# Patient Record
Sex: FEMALE | Race: BLACK OR AFRICAN AMERICAN | Employment: OTHER | URBAN - METROPOLITAN AREA
[De-identification: names, ages, dates, MRNs, and addresses within clinical notes are randomized per-mention and may not be internally consistent; named-entity substitution may affect disease eponyms.]

---

## 2017-01-05 DIAGNOSIS — K21.9 GASTROESOPHAGEAL REFLUX DISEASE WITHOUT ESOPHAGITIS: ICD-10-CM

## 2017-01-07 RX ORDER — ESOMEPRAZOLE MAGNESIUM 40 MG/1
40 CAPSULE, DELAYED RELEASE ORAL DAILY
Qty: 30 CAP | Refills: 11 | Status: SHIPPED | OUTPATIENT
Start: 2017-01-07 | End: 2017-02-04 | Stop reason: SDUPTHER

## 2017-02-04 DIAGNOSIS — K21.9 GASTROESOPHAGEAL REFLUX DISEASE WITHOUT ESOPHAGITIS: ICD-10-CM

## 2017-02-04 RX ORDER — ESOMEPRAZOLE MAGNESIUM 40 MG/1
CAPSULE, DELAYED RELEASE ORAL
Qty: 30 CAP | Refills: 11 | Status: SHIPPED | OUTPATIENT
Start: 2017-02-04 | End: 2017-03-15 | Stop reason: DRUGHIGH

## 2017-02-28 ENCOUNTER — TELEPHONE (OUTPATIENT)
Dept: INTERNAL MEDICINE CLINIC | Age: 67
End: 2017-02-28

## 2017-03-02 NOTE — TELEPHONE ENCOUNTER
Patient called and stated that she would like Nexium prescription because it is the only thing that works. She would like the status of the prior authorization. The contact number is [phone].

## 2017-03-03 DIAGNOSIS — E11.42 DIABETIC PERIPHERAL NEUROPATHY ASSOCIATED WITH TYPE 2 DIABETES MELLITUS (HCC): ICD-10-CM

## 2017-03-03 DIAGNOSIS — G60.8 IDIOPATHIC SMALL AND LARGE FIBER SENSORY NEUROPATHY: ICD-10-CM

## 2017-03-03 DIAGNOSIS — M79.2 NEUROPATHIC PAIN: ICD-10-CM

## 2017-03-03 NOTE — TELEPHONE ENCOUNTER
She is not due for refill as prescribed until April 21, please call and find out how much she is taking

## 2017-03-03 NOTE — TELEPHONE ENCOUNTER
Patient says she doesn't see the doctor she was referred to another doctor but doesn't have an appointment until 3/17. Can she get enough gabapentin to last until then? Her other doc didn't feel comfortable prescribing it.

## 2017-03-06 NOTE — TELEPHONE ENCOUNTER
Patient called to check on the status of her prior authorization for the Nexium medication. The contact number is [phone].

## 2017-03-10 ENCOUNTER — DOCUMENTATION ONLY (OUTPATIENT)
Dept: INTERNAL MEDICINE CLINIC | Age: 67
End: 2017-03-10

## 2017-03-10 NOTE — PROGRESS NOTES
This writer spoke with [facility] about the patients medication (nexium). The  Pharmacy states that the medication doesn't require a prior authorization pt requested a refill too soon and was informed that her insurance wouldn't cover the early refill. This writer also spoke with Ms. Mirlande Enamorado about her medication. Christiana Sainz was informed that she is able to  the medication from the pharmacy that it doesn't require a PA. Pt was informed that she requested a refill too soon that why she was unable to receive. Pt states she will have [employer] insurance until April 1st and then her coverage will switch to [employer] and she will need a PA for the esomeprazole(Nexium) then. . Pt verbalized Understanding.

## 2017-03-15 ENCOUNTER — OFFICE VISIT (OUTPATIENT)
Dept: INTERNAL MEDICINE CLINIC | Age: 67
End: 2017-03-15

## 2017-03-15 VITALS
RESPIRATION RATE: 20 BRPM | TEMPERATURE: 98.2 F | HEART RATE: 93 BPM | OXYGEN SATURATION: 96 % | HEIGHT: 69 IN | SYSTOLIC BLOOD PRESSURE: 151 MMHG | DIASTOLIC BLOOD PRESSURE: 86 MMHG | WEIGHT: 217 LBS | BODY MASS INDEX: 32.14 KG/M2

## 2017-03-15 DIAGNOSIS — K21.9 GERD WITHOUT ESOPHAGITIS: ICD-10-CM

## 2017-03-15 DIAGNOSIS — M25.512 CHRONIC LEFT SHOULDER PAIN: Primary | ICD-10-CM

## 2017-03-15 DIAGNOSIS — G89.29 CHRONIC LEFT SHOULDER PAIN: Primary | ICD-10-CM

## 2017-03-15 DIAGNOSIS — G60.8 IDIOPATHIC SMALL AND LARGE FIBER SENSORY NEUROPATHY: ICD-10-CM

## 2017-03-15 RX ORDER — GABAPENTIN 300 MG/1
600 CAPSULE ORAL 3 TIMES DAILY
Qty: 180 CAP | Refills: 11 | Status: SHIPPED | OUTPATIENT
Start: 2017-03-15

## 2017-03-15 RX ORDER — OMEPRAZOLE 40 MG/1
40 CAPSULE, DELAYED RELEASE ORAL DAILY
Qty: 30 CAP | Refills: 11 | Status: SHIPPED | OUTPATIENT
Start: 2017-03-15 | End: 2017-05-31 | Stop reason: ALTCHOICE

## 2017-03-15 NOTE — PROGRESS NOTES
Michelle Hoskins is a 74 y.o. female and presents with Follow-up (EMG); Foot Pain; and Shoulder Pain  . C/o L shoulder pain x November. She says it started after getting EMG done. Neuro sent her to pain management but pt didn't have a referral. Pain in constant. Worse when it is cold. No change in movement. ?Worse at night. +h/o recurrent shoulder dislocation but this hasnt happened in years. Takes Naprosyn which helps but doesn't cure. Has bilateral tingling in her feet. 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 Nexium isnt covered by insurance. Prilosec and Protonix are covered. Review of Systems  Constitutional: negative for fevers, chills, anorexia and weight loss  Eyes:   negative for visual disturbance and irritation  ENT:   negative for tinnitus,sore throat,nasal congestion,ear pains. hoarseness  Respiratory:  negative for cough, hemoptysis, dyspnea,wheezing  CV:   negative for chest pain, palpitations, lower extremity edema  GI:   negative for nausea, vomiting, diarrhea, abdominal pain,melena  Endo:               negative for polyuria,polydipsia,polyphagia,heat intolerance  Genitourinary: negative for frequency, dysuria and hematuria  Integument:  negative for rash and pruritus  Hematologic:  negative for easy bruising and gum/nose bleeding  Musculoskel: negative for myalgias, arthralgias, back pain, muscle weakness  Neurological:  negative for headaches, dizziness, vertigo, memory problems and gait   Behavl/Psych: negative for feelings of anxiety, depression, mood changes    Past Medical History:   Diagnosis Date    Arthritis     Bilateral Knee     Bipolar I disorder with mood-congruent psychotic features ([facility] [address]     Depressed     Depression     Hypertension     Psychotic disorder     Auditory Hallucinations      Past Surgical History:   Procedure Laterality Date    BREAST SURGERY PROCEDURE UNLISTED Left     Mass/Lump Removal     HX BREAST LUMPECTOMY      HX ORTHOPAEDIC      HX ORTHOPAEDIC      Pin Placed In (R) Shoulder     Social History     Social History    Marital status: LEGALLY      Spouse name: N/A    Number of children: N/A    Years of education: N/A     Social History Main Topics    Smoking status: Former Smoker    Smokeless tobacco: Never Used    Alcohol use No    Drug use: No    Sexual activity: Yes     Birth control/ protection: None     Other Topics Concern    None     Social History Narrative    ** Merged History Encounter **          Current Outpatient Prescriptions   Medication Sig Dispense Refill    gabapentin (NEURONTIN) 300 mg capsule Take 2 Caps by mouth three (3) times daily. 180 Cap 11    omeprazole (PRILOSEC) 40 mg capsule Take 1 Cap by mouth daily. 30 Cap 11    naproxen (NAPROSYN) 500 mg tablet Take 1 Tab by mouth two (2) times daily (with meals). 60 Tab 11    amitriptyline (ELAVIL) 50 mg tablet Take  by mouth nightly.  food supplemt, lactose-reduced (ENSURE COMPLETE) 0.05-1.5 gram-kcal/mL liqd Take 1 Can by mouth three (3) times daily (with meals). 90 Can 1    risperiDONE (RISPERDAL) 3 mg tablet Take 6 mg by mouth nightly.  sertraline (ZOLOFT) 100 mg tablet Take 200 mg by mouth daily.  LORazepam (ATIVAN) 1 mg tablet Take one tablet by mouth prior to EMG procedure.  1 Tab 0     Allergies   Allergen Reactions    Sulfa (Sulfonamide Antibiotics) Itching and Swelling    Sulfa (Sulfonamide Antibiotics) Hives       Objective:  Visit Vitals    /86 (BP 1 Location: Left arm, BP Patient Position: Sitting)    Pulse 93    Temp 98.2 °F (36.8 °C) (Oral)    Resp 20    Ht 5' 9\" (1.753 m)    Wt 217 lb (98.4 kg)    SpO2 96%    BMI 32.05 kg/m2     Physical Exam:   General appearance - alert, well appearing, and in no distress  Mental status - alert, oriented to person, place, and time  Chest - clear to auscultation, no wheezes, rales or rhonchi, symmetric air entry   Heart - normal rate, regular rhythm, normal S1, S2, no murmurs, rubs, clicks or gallops   Abdomen - soft, nontender, nondistended, no masses or organomegaly  Lymph- no adenopathy palpable  Ext-peripheral pulses normal, no pedal edema, no clubbing or cyanosis  Skin-Warm and dry. no hyperpigmentation, vitiligo, or suspicious lesions  Neuro -alert, oriented, normal speech, no focal findings or movement disorder noted    Assessment/Plan:    ICD-10-CM ICD-9-CM    1. Chronic left shoulder pain M25.512 719.41 XR SHOULDER LT AP/LAT MIN 2 V    G89.29 338.29 REFERRAL TO ORTHOPEDICS   2. Idiopathic small and large fiber sensory neuropathy G60.8 356.4 gabapentin (NEURONTIN) 300 mg capsule   3. GERD without esophagitis K21.9 530.81 omeprazole (PRILOSEC) 40 mg capsule     Orders Placed This Encounter    XR SHOULDER LT AP/LAT MIN 2 V     Standing Status:   Future     Standing Expiration Date:   4/15/2018     Order Specific Question:   Reason for Exam     Answer:   pain     Order Specific Question:   Is Patient Allergic to Contrast Dye? Answer:   No    REFERRAL TO ORTHOPEDICS     Referral Priority:   Routine     Referral Type:   Consultation     Referral Reason:   Specialty Services Required     Referral Location:   [facility]     Referred to Provider:   Dc Em MD    gabapentin (NEURONTIN) 300 mg capsule     Sig: Take 2 Caps by mouth three (3) times daily. Dispense:  180 Cap     Refill:  11    omeprazole (PRILOSEC) 40 mg capsule     Sig: Take 1 Cap by mouth daily. Dispense:  30 Cap     Refill:  11     L shoulder pain- likely DJD- xray ordered. Ortho referral  GERD- changed Nexium to Prilosec due to insurance coverage  Neuropathy- increased neurontin to 600mg tid    Follow-up Disposition:  Return if symptoms worsen or fail to improve.

## 2017-03-15 NOTE — MR AVS SNAPSHOT
Visit Information Date & Time Provider Department Dept. Phone Encounter #  
 3/15/2017  3:[address]  [address]  [id number] Follow-up Instructions Return if symptoms worsen or fail to improve. Your Appointments 6/5/2017  1:40 PM  
Follow Up with Manjinder Glez MD  
Neurology Clinic at [facility]) Appt Note: Follow up $0CP tdb 11/12/16  
 [address] [facility], 
[address] [place] [address], [phone]  
[address] [facility], [address] [facility] [place], [address] [address] Upcoming Health Maintenance Date Due Hepatitis C Screening 1950 FOOT EXAM Q1 2/7/1962 MICROALBUMIN Q1 2/7/1962 EYE EXAM RETINAL OR DILATED Q1 2/7/1962 DTaP/Tdap/Td series (1 - Tdap) 2/7/1973 ZOSTER VACCINE AGE 60> 2/7/2012 INFLUENZA AGE 9 TO ADULT 8/1/2016 LIPID PANEL Q1 9/14/2016 GLAUCOMA SCREENING Q2Y 2/7/2017 OSTEOPOROSIS SCREENING (DEXA) 2/7/2017 Pneumococcal 65+ Low/Medium Risk (1 of 2 - PCV13) 2/7/2017 MEDICARE YEARLY EXAM 2/7/2017 HEMOGLOBIN A1C Q6M 5/16/2017 BREAST CANCER SCRN MAMMOGRAM 5/27/2018 COLONOSCOPY 7/31/2025 Allergies as of 3/15/2017  Review Complete On: 3/15/2017 By: Isaías Garza LPN Severity Noted Reaction Type Reactions Sulfa (Sulfonamide Antibiotics) High 08/25/2015    Itching, Swelling Sulfa (Sulfonamide Antibiotics)  01/09/2013    Hives Current Immunizations  Reviewed on 4/8/2016 No immunizations on file. Not reviewed this visit You Were Diagnosed With   
  
 Codes Comments Chronic left shoulder pain    -  Primary ICD-10-CM: M25.512, O40.25 ICD-9-CM: 719.41, 338.29 Idiopathic small and large fiber sensory neuropathy     ICD-10-CM: G60.8 ICD-9-CM: 356.4 GERD without esophagitis     ICD-10-CM: K21.9 ICD-9-CM: 530.81 Vitals BP Pulse Temp Resp Height(growth percentile) Weight(growth percentile) 151/86 (BP 1 Location: Left arm, BP Patient Position: Sitting) 93 98.2 °F (36.8 °C) (Oral) 20 5' 9\" (1.753 m) 217 lb (98.4 kg) SpO2 BMI OB Status Smoking Status 96% 32.05 kg/m2 Postmenopausal Former Smoker Vitals History BMI and BSA Data Body Mass Index Body Surface Area 32.05 kg/m 2 2.19 m 2 Preferred Pharmacy Pharmacy Name Phone [facility] [address] [place] [address] [phone] Your Updated Medication List  
  
   
This list is accurate as of: 3/15/17  3:12 PM.  Always use your most recent med list.  
  
  
  
  
 amitriptyline 50 mg tablet Commonly known as:  ELAVIL Take  by mouth nightly. food supplemt, lactose-reduced 0.05-1.5 gram-kcal/mL Liqd Commonly known as:  ENSURE COMPLETE Take 1 Can by mouth three (3) times daily (with meals). gabapentin 300 mg capsule Commonly known as:  NEURONTIN Take 2 Caps by mouth three (3) times daily. LORazepam 1 mg tablet Commonly known as:  ATIVAN Take one tablet by mouth prior to EMG procedure. naproxen 500 mg tablet Commonly known as:  NAPROSYN Take 1 Tab by mouth two (2) times daily (with meals). omeprazole 40 mg capsule Commonly known as:  PRILOSEC Take 1 Cap by mouth daily. risperiDONE 3 mg tablet Commonly known as:  RisperDAL Take 6 mg by mouth nightly. sertraline 100 mg tablet Commonly known as:  ZOLOFT Take 200 mg by mouth daily. Prescriptions Sent to Pharmacy Refills  
 gabapentin (NEURONTIN) 300 mg capsule 11 Sig: Take 2 Caps by mouth three (3) times daily. Class: Normal  
 Pharmacy: [address] [place] [address] Ph #: [phone] Route: Oral  
 omeprazole (PRILOSEC) 40 mg capsule 11 Sig: Take 1 Cap by mouth daily.   
 Class: Normal  
 [facility] [address] [place] [address] Ravi  #: [phone] Route: Oral  
  
We Performed the Following REFERRAL TO ORTHOPEDICS [IKK658 Custom] Follow-up Instructions Return if symptoms worsen or fail to improve. To-Do List   
 03/15/2017 Imaging:  XR SHOULDER LT AP/LAT MIN 2 V Referral Information Referral ID Referred By Referred To  
  
 [id number] [facility], [address]  [address]1 C/Bo Morales Final Visits Status Start Date End Date 1 New Request 3/15/17 3/15/18 If your referral has a status of pending review or denied, additional information will be sent to support the outcome of this decision. Introducing [address]! Cassidy Banuelos introduces [employer] patient portal. Now you can access parts of your medical record, email your doctor's office, and request medication refills online. 1. In your internet browser, go to [email or web address] 2. Click on the First Time User? Click Here link in the Sign In box. You will see the New Member Sign Up page. 3. Enter your [employer] Access Code exactly as it appears below. You will not need to use this code after youve completed the sign-up process. If you do not sign up before the expiration date, you must request a new code. · [employer] Access Code: [email or web address] Expires: 6/12/2017  4:42 PM 
 
4. Enter the last four digits of your Social Security Number (xxxx) and Date of Birth (mm/dd/yyyy) as indicated and click Submit. You will be taken to the next sign-up page. 5. Create a [employer]asis ID. This will be your [employer] login ID and cannot be changed, so think of one that is secure and easy to remember. 6. Create a [employer] password. You can change your password at any time. 7. Enter your Password Reset Question and Answer. This can be used at a later time if you forget your password. 8. Enter your e-mail address. You will receive e-mail notification when new information is available in [address] 9. Click Sign Up. You can now view and download portions of your medical record. 10. Click the Download Summary menu link to download a portable copy of your medical information. If you have questions, please visit the Frequently Asked Questions section of the [employer] website. Remember, [employer] is NOT to be used for urgent needs. For medical emergencies, dial 911. Now available from your iPhone and Android! Please provide this summary of care documentation to your next provider. Your primary care clinician is listed as [address]. If you have any questions after today's visit, please call [phone].

## 2017-03-15 NOTE — PROGRESS NOTES
1. Have you been to the ER, urgent care clinic since your last visit? Hospitalized since your last visit? No.    2. Have you seen or consulted any other health care providers outside of the [address] [place] [address] since your last visit? Include any pap smears or colon screening. No.  Had naproxen today.

## 2017-04-07 RX ORDER — GABAPENTIN 300 MG/1
CAPSULE ORAL
Qty: 120 CAP | Refills: 5 | Status: SHIPPED | OUTPATIENT
Start: 2017-04-07 | End: 2017-04-27 | Stop reason: SDUPTHER

## 2017-04-26 ENCOUNTER — TELEPHONE (OUTPATIENT)
Dept: INTERNAL MEDICINE CLINIC | Age: 67
End: 2017-04-26

## 2017-04-27 ENCOUNTER — OFFICE VISIT (OUTPATIENT)
Dept: INTERNAL MEDICINE CLINIC | Age: 67
End: 2017-04-27

## 2017-04-27 VITALS
RESPIRATION RATE: 16 BRPM | OXYGEN SATURATION: 95 % | HEART RATE: 92 BPM | TEMPERATURE: 97.9 F | BODY MASS INDEX: 32.08 KG/M2 | SYSTOLIC BLOOD PRESSURE: 123 MMHG | WEIGHT: 216.6 LBS | DIASTOLIC BLOOD PRESSURE: 82 MMHG | HEIGHT: 69 IN

## 2017-04-27 DIAGNOSIS — Z00.00 GENERAL MEDICAL EXAM: ICD-10-CM

## 2017-04-27 DIAGNOSIS — D64.9 ANEMIA, UNSPECIFIED TYPE: ICD-10-CM

## 2017-04-27 DIAGNOSIS — Z13.220 SCREENING FOR CHOLESTEROL LEVEL: ICD-10-CM

## 2017-04-27 DIAGNOSIS — Z11.59 NEED FOR HEPATITIS C SCREENING TEST: ICD-10-CM

## 2017-04-27 DIAGNOSIS — R73.9 HYPERGLYCEMIA: ICD-10-CM

## 2017-04-27 DIAGNOSIS — Z13.21 ENCOUNTER FOR VITAMIN DEFICIENCY SCREENING: ICD-10-CM

## 2017-04-27 DIAGNOSIS — E46 MALNUTRITION (HCC): ICD-10-CM

## 2017-04-27 DIAGNOSIS — R73.03 PRE-DIABETES: Primary | ICD-10-CM

## 2017-04-27 DIAGNOSIS — M19.90 ARTHRITIS: ICD-10-CM

## 2017-04-27 DIAGNOSIS — Z12.31 SCREENING MAMMOGRAM, ENCOUNTER FOR: ICD-10-CM

## 2017-04-27 RX ORDER — AMITRIPTYLINE HYDROCHLORIDE 150 MG/1
150 TABLET, FILM COATED ORAL
COMMUNITY
Start: 2017-04-05

## 2017-04-27 NOTE — PATIENT INSTRUCTIONS
Anemia: Care Instructions  Your Care Instructions    Anemia is a low level of red blood cells, which carry oxygen throughout your body. Many things can cause anemia. Lack of iron is one of the most common causes. Your body needs iron to make hemoglobin, a substance in red blood cells that carries oxygen from the lungs to your body's cells. Without enough iron, the body produces fewer and smaller red blood cells. As a result, your body's cells do not get enough oxygen, and you feel tired and weak. And you may have trouble concentrating. Bleeding is the most common cause of a lack of iron. You may have heavy menstrual bleeding or bleeding caused by conditions such as ulcers, hemorrhoids, or cancer. Regular use of aspirin or other anti-inflammatory medicines (such as ibuprofen) also can cause bleeding in some people. A lack of iron in your diet also can cause anemia, especially at times when the body needs more iron, such as during pregnancy, infancy, and the teen years. Your doctor may have prescribed iron pills. It may take several months of treatment for your iron levels to return to normal. Your doctor also may suggest that you eat foods that are rich in iron, such as meat and beans. There are many other causes of anemia. It is not always due to a lack of iron. Finding the specific cause of your anemia will help your doctor find the right treatment for you. Follow-up care is a key part of your treatment and safety. Be sure to make and go to all appointments, and call your doctor if you are having problems. It's also a good idea to know your test results and keep a list of the medicines you take. How can you care for yourself at home? · Take your medicines exactly as prescribed. Call your doctor if you think you are having a problem with your medicine. · If your doctor recommends iron pills, take them as directed:  ¨ Try to take the pills on an empty stomach about 1 hour before or 2 hours after meals. But you may need to take iron with food to avoid an upset stomach. ¨ Do not take antacids or drink milk or caffeine drinks (such as coffee, tea, or cola) at the same time or within 2 hours of the time that you take your iron. They can make it hard for your body to absorb the iron. ¨ Vitamin C (from food or supplements) helps your body absorb iron. Try taking iron pills with a glass of orange juice or some other food that is high in vitamin C, such as citrus fruits. ¨ Iron pills may cause stomach problems, such as heartburn, nausea, diarrhea, constipation, and cramps. Be sure to drink plenty of fluids, and include fruits, vegetables, and fiber in your diet each day. Iron pills often make your bowel movements dark or green. ¨ If you forget to take an iron pill, do not take a double dose of iron the next time you take a pill. ¨ Keep iron pills out of the reach of small children. An overdose of iron can be very dangerous. · Follow your doctor's advice about eating iron-rich foods. These include red meat, shellfish, poultry, eggs, beans, raisins, whole-grain bread, and leafy green vegetables. · Steam vegetables to help them keep their iron content. When should you call for help? Call 911 anytime you think you may need emergency care. For example, call if:  · You have symptoms of a heart attack. These may include:  ¨ Chest pain or pressure, or a strange feeling in the chest.  ¨ Sweating. ¨ Shortness of breath. ¨ Nausea or vomiting. ¨ Pain, pressure, or a strange feeling in the back, neck, jaw, or upper belly or in one or both shoulders or arms. ¨ Lightheadedness or sudden weakness. ¨ A fast or irregular heartbeat. After you call 911, the  may tell you to chew 1 adult-strength or 2 to 4 low-dose aspirin. Wait for an ambulance. Do not try to drive yourself. · You passed out (lost consciousness).   Call your doctor now or seek immediate medical care if:  · You have new or increased shortness of breath. · You are dizzy or lightheaded, or you feel like you may faint. · Your fatigue and weakness continue or get worse. · You have any abnormal bleeding, such as:  ¨ Nosebleeds. ¨ Vaginal bleeding that is different (heavier, more frequent, at a different time of the month) than what you are used to. ¨ Bloody or black stools, or rectal bleeding. ¨ Bloody or pink urine. Watch closely for changes in your health, and be sure to contact your doctor if:  · You do not get better as expected. Where can you learn more? Go to http://dario-ilana.info/. Enter R301 in the search box to learn more about \"Anemia: Care Instructions. \"  Current as of: October 13, 2016  Content Version: 11.2  © 9840-2486 [employer]. Care instructions adapted under license by [employer] (which disclaims liability or warranty for this information). If you have questions about a medical condition or this instruction, always ask your healthcare professional. [place] [address] any warranty or liability for your use of this information.

## 2017-04-27 NOTE — PROGRESS NOTES
Ms. Sterling Huang is a new patient who is here to establish care. CC:  Establish Care  pain in multiple joints      HPI:    Previously a patient of Dr Maria Del Carmen Faye      Pain : Complains of pain in multiple joints - on left side mostly knees and shoulder and also pain right knee:   Pain isintermittent for a couple of years. Knee pain is currently a 0 - pain comes when patient is standing up. Shoulder pain dull ache - worsened by using shoulder  Using naproxen as needed but not everyday. Naproxen provides relief  Using joints causes pain     Neuropathy: Feet had abnormal sensation leading to EMG and diagnosed with    diabetic neuropathy- patient was seen by neurology had EMG and currently on gabapentin and elavil which is helping with neuropathic symptoms.    Gapapentin taking 600mg TID   Last HA1C is 5.7 in Dec 2016  Patient is concerned she may have diabetes not on treatment     Bipolar: followed by Dr Shannon Arboleda is prescribing the risperdal, lorazepam and sertraline - mood is table       Breast lump removed - told not cancer     Colonoscopy in 2015 and had polyp removed   Lives in subsidized  housing     Review of systems:  Constitutional: negative for fever, chills, weight loss, night sweats   Eyes : negative for vision changes, eye pain and discharge  Nose and Throat: negative for tinnitus, sore throat   Cardiovascular: negative for chest pain, palpitations and shortness of breath  Respiratory: negative for shortness of breath, cough and wheezing   Gastroinstestinal: negative for abdominal pain, nausea, vomiting, diarrhea, constipation, and blood in the stool  Musculoskeletal: see HPI   Genitourinary: negative for dysuria, nocturia, polyuria and hematuria   Neurologic: Negative for focal weakness,  Incoordination - has numbness in feet   Skin: negative for rash, pruritus  Hematologic: negative for easy bruising      Past Medical History:   Diagnosis Date    Arthritis     Bilateral Knee     Bipolar I disorder with mood-congruent psychotic features ([facility] [address]     Depressed     Depression     Hypertension     Psychotic disorder     Auditory Hallucinations         Past Surgical History:   Procedure Laterality Date    BREAST SURGERY PROCEDURE UNLISTED Left     Mass/Lump Removal     HX BREAST LUMPECTOMY      HX ORTHOPAEDIC      HX ORTHOPAEDIC      Pin Placed In (R) Shoulder       Allergies   Allergen Reactions    Sulfa (Sulfonamide Antibiotics) Itching and Swelling    Sulfa (Sulfonamide Antibiotics) Hives       Current Outpatient Prescriptions on File Prior to Visit   Medication Sig Dispense Refill    gabapentin (NEURONTIN) 300 mg capsule Take 2 Caps by mouth three (3) times daily. 180 Cap 11    omeprazole (PRILOSEC) 40 mg capsule Take 1 Cap by mouth daily. 30 Cap 11    naproxen (NAPROSYN) 500 mg tablet Take 1 Tab by mouth two (2) times daily (with meals). 60 Tab 11    risperiDONE (RISPERDAL) 3 mg tablet Take 6 mg by mouth nightly.  sertraline (ZOLOFT) 100 mg tablet Take 200 mg by mouth daily.  LORazepam (ATIVAN) 1 mg tablet Take one tablet by mouth prior to EMG procedure. 1 Tab 0    food supplemt, lactose-reduced (ENSURE COMPLETE) 0.05-1.5 gram-kcal/mL liqd Take 1 Can by mouth three (3) times daily (with meals). 90 Can 1     No current facility-administered medications on file prior to visit. family history includes Cancer in her father, mother, paternal aunt, and paternal grandmother; Diabetes in her paternal aunt. Social History     Social History    Marital status: LEGALLY      Spouse name: N/A    Number of children: N/A    Years of education: N/A     Occupational History    Not on file.      Social History Main Topics    Smoking status: Former Smoker    Smokeless tobacco: Never Used    Alcohol use No    Drug use: No    Sexual activity: Yes     Birth control/ protection: None     Other Topics Concern    Not on file     Social History Narrative    ** Merged History Encounter **            Visit Vitals    /82 (BP 1 Location: Left arm, BP Patient Position: Sitting)    Pulse 92    Temp 97.9 °F (36.6 °C) (Oral)    Resp 16    Ht 5' 9\" (1.753 m)    Wt 216 lb 9.6 oz (98.2 kg)    SpO2 95%    BMI 31.99 kg/m2     General:  Well appearing female no acute distress  HEENT:   PERRL,normal conjunctiva. External ear and canals normal, TMs normal.  Hearing normal to voice. Nose without edema or discharge, normal septum. Lips, teeth, gums normal.  Oropharynx: no erythema, no exudates, no lesions, normal tongue. Neck:  Supple. Thyroid normal size, nontender, without nodules. No carotid bruit. No masses or lymphadenopathy  Respiratory: no respiratory distress,  no wheezing, no rhonchi, no rales. No chest wall tenderness. Cardiovascular:  RRR, normal S1S2, no murmur. Gastrointestinal: normal bowel sounds, soft, nontender, without masses. No hepatosplenomegaly. Extremities +2 pulses, no edema, normal sensation   Musculoskeletal:  Normal gait. Normal digits and nails. Normal strength and tone, no atrophy, and no abnormal movement. Knees with crepitus B/L but no swelling or redness   Skin:  No rash, no lesions, no ulcers. Skin warm, normal turgor, without induration or nodules. Neuro:  A and OX4, fluent speech, cranial nerves normal 2-12.   Psych:  Normal affect      Lab Results   Component Value Date/Time    WBC 6.5 04/10/2016 02:49 AM    HGB 10.5 04/10/2016 02:49 AM    HCT 32.5 04/10/2016 02:49 AM    PLATELET 927 43/53/7000 02:49 AM    MCV 94.5 04/10/2016 02:49 AM     Lab Results   Component Value Date/Time    Sodium 144 04/10/2016 02:49 AM    Potassium 3.3 04/10/2016 02:49 AM    Chloride 107 04/10/2016 02:49 AM    CO2 24 04/10/2016 02:49 AM    Anion gap 13 04/10/2016 02:49 AM    Glucose 90 04/10/2016 02:49 AM    BUN 9 04/10/2016 02:49 AM    Creatinine 0.45 04/10/2016 02:49 AM    BUN/Creatinine ratio 20 04/10/2016 02:49 AM    GFR est AA >60 04/10/2016 02:49 AM    GFR est non-AA >60 04/10/2016 02:49 AM    Calcium 7.9 04/10/2016 02:49 AM     Lab Results   Component Value Date/Time    Cholesterol, total 291 09/14/2015 12:00 AM    HDL Cholesterol 104 09/14/2015 12:00 AM    LDL, calculated 163 09/14/2015 12:00 AM    VLDL, calculated 24 09/14/2015 12:00 AM    Triglyceride 118 09/14/2015 12:00 AM     Lab Results   Component Value Date/Time    TSH 1.980 03/01/2016 12:00 AM     Lab Results   Component Value Date/Time    Hemoglobin A1c 5.7 11/16/2016 12:43 PM           Vitamin B12 500 in 2016        Assessment and Plan:     1. Pre-diabetes  - patient has diabetic neuropathy diagnosed with EMG - patient does not have diagnosis of diabetes   - HEMOGLOBIN A1C WITH EAG    2. Anemia, unspecified type  Noted on labs in April - denies bleeding   - CBC WITH AUTOMATED DIFF  - VITAMIN B12 & FOLATE  - IRON PROFILE  - FERRITIN    3. Pain shoulders and knees: hx and exam consistent with OA   - continue NSAID's PRN and tylenol  - advised to loose weight and walk daily     4. Screening for cholesterol level  - LIPID PANEL      5 . Bipolar  -followed by Dr Marilin Bowen is prescribing the risperdal, lorazepam and sertraline - mood is table     6. General medical exam  - METABOLIC PANEL, COMPREHENSIVE    7. Screening mammogram, encounter for  - [facility] BI SCREENING INCL CAD; Future    8. Need for hepatitis C screening test  - HEPATITIS C AB    Reviewed last 2 notes from neurology and last notes from Dr Saúl Rudolph Disposition:  Return in about 4 weeks (around 5/25/2017)medicare wellness visit .      Kiya Armando MD

## 2017-04-27 NOTE — MR AVS SNAPSHOT
Visit Information Date & Time Provider Department Dept. Phone Encounter #  
 4/27/2017  2:45 PM John Lamb [address] [place] [address],[address] [phone] [id number] Follow-up Instructions Return in about 4 weeks (around 5/25/2017) for 1-2 for medicare wellness visit . Your Appointments 3/16/2018 11:00 AM  
ROUTINE CARE with Rachna Putnam MD  
[address] [place] [address] Appt Note: 1 year follow up [place] [address] [facility] [address]  
[phone]  
  
   
 [place] [address] Upcoming Health Maintenance Date Due Hepatitis C Screening 1950 FOOT EXAM Q1 2/7/1962 MICROALBUMIN Q1 2/7/1962 EYE EXAM RETINAL OR DILATED Q1 2/7/1962 DTaP/Tdap/Td series (1 - Tdap) 2/7/1973 ZOSTER VACCINE AGE 60> 2/7/2012 INFLUENZA AGE 9 TO ADULT 8/1/2016 LIPID PANEL Q1 9/14/2016 GLAUCOMA SCREENING Q2Y 2/7/2017 OSTEOPOROSIS SCREENING (DEXA) 2/7/2017 Pneumococcal 65+ Low/Medium Risk (1 of 2 - PCV13) 2/7/2017 MEDICARE YEARLY EXAM 2/7/2017 HEMOGLOBIN A1C Q6M 5/16/2017 BREAST CANCER SCRN MAMMOGRAM 5/27/2018 COLONOSCOPY 7/31/2025 Allergies as of 4/27/2017  Review Complete On: 4/27/2017 By: Julián Forte LPN Severity Noted Reaction Type Reactions Sulfa (Sulfonamide Antibiotics) High 08/25/2015    Itching, Swelling Sulfa (Sulfonamide Antibiotics)  01/09/2013    Hives Current Immunizations  Reviewed on 4/8/2016 No immunizations on file. Not reviewed this visit You Were Diagnosed With   
  
 Codes Comments Pre-diabetes    -  Primary ICD-10-CM: R73.03 
ICD-9-CM: 790.29 Anemia, unspecified type     ICD-10-CM: D64.9 ICD-9-CM: 285.9 Encounter for vitamin deficiency screening     ICD-10-CM: Z13.21 ICD-9-CM: V77.99 Screening for cholesterol level     ICD-10-CM: Z13.220 ICD-9-CM: V77.91 General medical exam     ICD-10-CM: Z00.00 ICD-9-CM: V70.9 Hyperglycemia     ICD-10-CM: R73.9 ICD-9-CM: 790.29 Malnutrition ([address]     ICD-10-CM: E46 
ICD-9-CM: 263.9 Screening mammogram, encounter for     ICD-10-CM: Z12.31 
ICD-9-CM: V76.12 Need for hepatitis C screening test     ICD-10-CM: Z11.59 
ICD-9-CM: V73.89 Vitals BP Pulse Temp Resp Height(growth percentile) Weight(growth percentile) 123/82 (BP 1 Location: Left arm, BP Patient Position: Sitting) 92 97.9 °F (36.6 °C) (Oral) 16 5' 9\" (1.753 m) 216 lb 9.6 oz (98.2 kg) SpO2 BMI OB Status Smoking Status 95% 31.99 kg/m2 Postmenopausal Former Smoker Vitals History BMI and BSA Data Body Mass Index Body Surface Area 31.99 kg/m 2 2.19 m 2 Preferred Pharmacy Pharmacy Name Phone [phone] [place] [address] [phone] Your Updated Medication List  
  
   
This list is accurate as of: 4/27/17  3:57 PM.  Always use your most recent med list.  
  
  
  
  
 amitriptyline 150 mg tablet Commonly known as:  ELAVIL Take 150 mg by mouth nightly. food supplemt, lactose-reduced 0.05-1.5 gram-kcal/mL Liqd Commonly known as:  ENSURE COMPLETE Take 1 Can by mouth three (3) times daily (with meals). gabapentin 300 mg capsule Commonly known as:  NEURONTIN Take 2 Caps by mouth three (3) times daily. LORazepam 1 mg tablet Commonly known as:  ATIVAN Take one tablet by mouth prior to EMG procedure. naproxen 500 mg tablet Commonly known as:  NAPROSYN Take 1 Tab by mouth two (2) times daily (with meals). omeprazole 40 mg capsule Commonly known as:  PRILOSEC Take 1 Cap by mouth daily. risperiDONE 3 mg tablet Commonly known as:  RisperDAL Take 6 mg by mouth nightly. sertraline 100 mg tablet Commonly known as:  ZOLOFT Take 200 mg by mouth daily. We Performed the Following CBC WITH AUTOMATED DIFF [85137 CPT(R)] FERRITIN [22580 CPT(R)] HEMOGLOBIN A1C WITH EAG [33869 CPT(R)] HEPATITIS C AB [71525 CPT(R)] IRON PROFILE Y5242550 CPT(R)] LIPID PANEL [87879 CPT(R)] METABOLIC PANEL, COMPREHENSIVE [17229 CPT(R)] VITAMIN B12 & FOLATE [51133 CPT(R)] Follow-up Instructions Return in about 4 weeks (around 5/25/2017) for 1-2 for medicare wellness visit . To-Do List   
 05/27/2017 Imaging:  [facility] BI SCREENING INCL CAD Patient Instructions Anemia: Care Instructions Your Care Instructions Anemia is a low level of red blood cells, which carry oxygen throughout your body. Many things can cause anemia. Lack of iron is one of the most common causes. Your body needs iron to make hemoglobin, a substance in red blood cells that carries oxygen from the lungs to your body's cells. Without enough iron, the body produces fewer and smaller red blood cells. As a result, your body's cells do not get enough oxygen, and you feel tired and weak. And you may have trouble concentrating. Bleeding is the most common cause of a lack of iron. You may have heavy menstrual bleeding or bleeding caused by conditions such as ulcers, hemorrhoids, or cancer. Regular use of aspirin or other anti-inflammatory medicines (such as ibuprofen) also can cause bleeding in some people. A lack of iron in your diet also can cause anemia, especially at times when the body needs more iron, such as during pregnancy, infancy, and the teen years. Your doctor may have prescribed iron pills. It may take several months of treatment for your iron levels to return to normal. Your doctor also may suggest that you eat foods that are rich in iron, such as meat and beans. There are many other causes of anemia. It is not always due to a lack of iron. Finding the specific cause of your anemia will help your doctor find the right treatment for you. Follow-up care is a key part of your treatment and safety.  Be sure to make and go to all appointments, and call your doctor if you are having problems. It's also a good idea to know your test results and keep a list of the medicines you take. How can you care for yourself at home? · Take your medicines exactly as prescribed. Call your doctor if you think you are having a problem with your medicine. · If your doctor recommends iron pills, take them as directed: ¨ Try to take the pills on an empty stomach about 1 hour before or 2 hours after meals. But you may need to take iron with food to avoid an upset stomach. ¨ Do not take antacids or drink milk or caffeine drinks (such as coffee, tea, or cola) at the same time or within 2 hours of the time that you take your iron. They can make it hard for your body to absorb the iron. ¨ Vitamin C (from food or supplements) helps your body absorb iron. Try taking iron pills with a glass of orange juice or some other food that is high in vitamin C, such as citrus fruits. ¨ Iron pills may cause stomach problems, such as heartburn, nausea, diarrhea, constipation, and cramps. Be sure to drink plenty of fluids, and include fruits, vegetables, and fiber in your diet each day. Iron pills often make your bowel movements dark or green. ¨ If you forget to take an iron pill, do not take a double dose of iron the next time you take a pill. ¨ Keep iron pills out of the reach of small children. An overdose of iron can be very dangerous. · Follow your doctor's advice about eating iron-rich foods. These include red meat, shellfish, poultry, eggs, beans, raisins, whole-grain bread, and leafy green vegetables. · Steam vegetables to help them keep their iron content. When should you call for help? Call 911 anytime you think you may need emergency care. For example, call if: 
· You have symptoms of a heart attack. These may include: ¨ Chest pain or pressure, or a strange feeling in the chest. 
¨ Sweating. ¨ Shortness of breath. ¨ Nausea or vomiting. ¨ Pain, pressure, or a strange feeling in the back, neck, jaw, or upper belly or in one or both shoulders or arms. ¨ Lightheadedness or sudden weakness. ¨ A fast or irregular heartbeat. After you call 911, the  may tell you to chew 1 adult-strength or 2 to 4 low-dose aspirin. Wait for an ambulance. Do not try to drive yourself. · You passed out (lost consciousness). Call your doctor now or seek immediate medical care if: 
· You have new or increased shortness of breath. · You are dizzy or lightheaded, or you feel like you may faint. · Your fatigue and weakness continue or get worse. · You have any abnormal bleeding, such as: 
¨ Nosebleeds. ¨ Vaginal bleeding that is different (heavier, more frequent, at a different time of the month) than what you are used to. ¨ Bloody or black stools, or rectal bleeding. ¨ Bloody or pink urine. Watch closely for changes in your health, and be sure to contact your doctor if: 
· You do not get better as expected. Where can you learn more? Go to http://darioMammotomeilana.info/. Enter R301 in the search box to learn more about \"Anemia: Care Instructions. \" Current as of: October 13, 2016 Content Version: 11.2 © 3162-6621 [employer]. Care instructions adapted under license by [employer] (which disclaims liability or warranty for this information). If you have questions about a medical condition or this instruction, always ask your healthcare professional. [place] [address] any warranty or liability for your use of this information. Introducing [facility]! Desi Irizarry introduces [employer] patient portal. Now you can access parts of your medical record, email your doctor's office, and request medication refills online. 1. In your internet browser, go to [email or web address]/"[employer] 2. Click on the First Time User? Click Here link in the Sign In box.  You will see the New Member Sign Up page. 3. Enter your [employer] Access Code exactly as it appears below. You will not need to use this code after youve completed the sign-up process. If you do not sign up before the expiration date, you must request a new code. · [employer] Access Code: [email or web address] Expires: 6/12/2017  4:42 PM 
 
4. Enter the last four digits of your Social Security Number (xxxx) and Date of Birth (mm/dd/yyyy) as indicated and click Submit. You will be taken to the next sign-up page. 5. Create a [employer] ID. This will be your [employer] login ID and cannot be changed, so think of one that is secure and easy to remember. 6. Create a [employer] password. You can change your password at any time. 7. Enter your Password Reset Question and Answer. This can be used at a later time if you forget your password. 8. Enter your e-mail address. You will receive e-mail notification when new information is available in [address] [place] [address] Ave. 9. Click Sign Up. You can now view and download portions of your medical record. 10. Click the Download Summary menu link to download a portable copy of your medical information. If you have questions, please visit the Frequently Asked Questions section of the [employer] website. Remember, [employer] is NOT to be used for urgent needs. For medical emergencies, dial 911. Now available from your iPhone and Android! Please provide this summary of care documentation to your next provider. Your primary care clinician is listed as DRAGAN CASANOVA [address] [place] [address]. If you have any questions after today's visit, please call [phone].

## 2017-04-28 LAB
ALBUMIN SERPL-MCNC: 4.7 G/DL (ref 3.6–4.8)
ALBUMIN/GLOB SERPL: 1.5 {RATIO} (ref 1.2–2.2)
ALP SERPL-CCNC: 105 IU/L (ref 39–117)
ALT SERPL-CCNC: 17 IU/L (ref 0–32)
AST SERPL-CCNC: 17 IU/L (ref 0–40)
BASOPHILS # BLD AUTO: 0 X10E3/UL (ref 0–0.2)
BASOPHILS NFR BLD AUTO: 0 %
BILIRUB SERPL-MCNC: 0.2 MG/DL (ref 0–1.2)
BUN SERPL-MCNC: 13 MG/DL (ref 8–27)
BUN/CREAT SERPL: 19 (ref 12–28)
CALCIUM SERPL-MCNC: 9.3 MG/DL (ref 8.7–10.3)
CHLORIDE SERPL-SCNC: 101 MMOL/L (ref 96–106)
CHOLEST SERPL-MCNC: 289 MG/DL (ref 100–199)
CO2 SERPL-SCNC: 23 MMOL/L (ref 18–29)
CREAT SERPL-MCNC: 0.7 MG/DL (ref 0.57–1)
EOSINOPHIL # BLD AUTO: 0.2 X10E3/UL (ref 0–0.4)
EOSINOPHIL NFR BLD AUTO: 3 %
ERYTHROCYTE [DISTWIDTH] IN BLOOD BY AUTOMATED COUNT: 14.7 % (ref 12.3–15.4)
EST. AVERAGE GLUCOSE BLD GHB EST-MCNC: 120 MG/DL
FERRITIN SERPL-MCNC: 34 NG/ML (ref 15–150)
FOLATE SERPL-MCNC: 19 NG/ML
GLOBULIN SER CALC-MCNC: 3.1 G/DL (ref 1.5–4.5)
GLUCOSE SERPL-MCNC: 100 MG/DL (ref 65–99)
HBA1C MFR BLD: 5.8 % (ref 4.8–5.6)
HCT VFR BLD AUTO: 39.8 % (ref 34–46.6)
HCV AB S/CO SERPL IA: <0.1 S/CO RATIO (ref 0–0.9)
HDLC SERPL-MCNC: 95 MG/DL
HGB BLD-MCNC: 13.1 G/DL (ref 11.1–15.9)
IMM GRANULOCYTES # BLD: 0 X10E3/UL (ref 0–0.1)
IMM GRANULOCYTES NFR BLD: 0 %
IRON SATN MFR SERPL: 15 % (ref 15–55)
IRON SERPL-MCNC: 56 UG/DL (ref 27–139)
LDLC SERPL CALC-MCNC: 161 MG/DL (ref 0–99)
LYMPHOCYTES # BLD AUTO: 1.9 X10E3/UL (ref 0.7–3.1)
LYMPHOCYTES NFR BLD AUTO: 27 %
MCH RBC QN AUTO: 27.8 PG (ref 26.6–33)
MCHC RBC AUTO-ENTMCNC: 32.9 G/DL (ref 31.5–35.7)
MCV RBC AUTO: 84 FL (ref 79–97)
MONOCYTES # BLD AUTO: 0.4 X10E3/UL (ref 0.1–0.9)
MONOCYTES NFR BLD AUTO: 6 %
NEUTROPHILS # BLD AUTO: 4.4 X10E3/UL (ref 1.4–7)
NEUTROPHILS NFR BLD AUTO: 64 %
PLATELET # BLD AUTO: 156 X10E3/UL (ref 150–379)
POTASSIUM SERPL-SCNC: 3.8 MMOL/L (ref 3.5–5.2)
PROT SERPL-MCNC: 7.8 G/DL (ref 6–8.5)
RBC # BLD AUTO: 4.72 X10E6/UL (ref 3.77–5.28)
SODIUM SERPL-SCNC: 143 MMOL/L (ref 134–144)
TIBC SERPL-MCNC: 372 UG/DL (ref 250–450)
TRIGL SERPL-MCNC: 167 MG/DL (ref 0–149)
UIBC SERPL-MCNC: 316 UG/DL (ref 118–369)
VIT B12 SERPL-MCNC: 1172 PG/ML (ref 211–946)
VLDLC SERPL CALC-MCNC: 33 MG/DL (ref 5–40)
WBC # BLD AUTO: 6.9 X10E3/UL (ref 3.4–10.8)

## 2017-05-01 RX ORDER — METFORMIN HYDROCHLORIDE 500 MG/1
500 TABLET ORAL 2 TIMES DAILY WITH MEALS
Qty: 60 TAB | Refills: 5 | Status: SHIPPED | OUTPATIENT
Start: 2017-05-01 | End: 2017-06-29 | Stop reason: SINTOL

## 2017-05-01 NOTE — PROGRESS NOTES
Patient has pre diabetes - with elevated sugar average sugar is 120 - to have diagnosis of diabetes average sugar is 126. The sugar is higher now than 6 months ago I recommend patient starts metformin 500mg BID - to prevent diabetes- most common side effect bloating and loose stools in the first 2 weeks - if patient agrees can send prescription for metformin 500mg BID 60 pills with 5 refills. We will recheck value in 6 months   Remind patient of lifestyle modifications also     LDL which is bad cholesterol is mildly elevated recommend increased exercise to 30 minutes daily and increased fiber intake - vegetables, fruits and oats and whole grain. Decrease fatty food intake. We will repeat choletserol level in one year.      Patients blood count is normal - anemia resolved  Vitamin B12 and iron level are normal no supplementation is needed  Hep C testing hs negative

## 2017-05-01 NOTE — TELEPHONE ENCOUNTER
Spoke to patient and notified her of lab results and recommendations. Patient states she has no problem on starting Metformin 500 mg BID. Medication has been pended to provider. Patient was scheduled for a follow up appointment on November 9th, 2017.

## 2017-05-01 NOTE — TELEPHONE ENCOUNTER
----- Message from MD Adonis sent at 5/1/2017 10:20 AM EDT -----  Patient has pre diabetes - with elevated sugar average sugar is 120 - to have diagnosis of diabetes average sugar is 126. The sugar is higher now than 6 months ago I recommend patient starts metformin 500mg BID - to prevent diabetes- most common side effect bloating and loose stools in the first 2 weeks - if patient agrees can send prescription for metformin 500mg BID 60 pills with 5 refills. We will recheck value in 6 months   Remind patient of lifestyle modifications also     LDL which is bad cholesterol is mildly elevated recommend increased exercise to 30 minutes daily and increased fiber intake - vegetables, fruits and oats and whole grain. Decrease fatty food intake. We will repeat choletserol level in one year.      Patients blood count is normal - anemia resolved  Vitamin B12 and iron level are normal no supplementation is needed  Hep C testing hs negative

## 2017-05-25 ENCOUNTER — DOCUMENTATION ONLY (OUTPATIENT)
Dept: INTERNAL MEDICINE CLINIC | Age: 67
End: 2017-05-25

## 2017-05-25 NOTE — PROGRESS NOTES
Nexium was approved through [address] [place] [address] [id number] starting 5/25/17 ending 5/25/18

## 2017-05-31 RX ORDER — ESOMEPRAZOLE MAGNESIUM 40 MG/1
40 CAPSULE, DELAYED RELEASE ORAL DAILY
Qty: 30 CAP | Refills: 5 | Status: SHIPPED | OUTPATIENT
Start: 2017-05-31

## 2017-06-07 ENCOUNTER — TELEPHONE (OUTPATIENT)
Dept: INTERNAL MEDICINE CLINIC | Age: 67
End: 2017-06-07

## 2017-06-07 NOTE — TELEPHONE ENCOUNTER
Spoke with patient about medication, will call the insurance company to see if Brand name could be substituted for nexium

## 2017-06-07 NOTE — TELEPHONE ENCOUNTER
Pt would like for Katelynn Vila to give her a call regarding Nexium medication.  Best contact number for pt is [phone].

## 2017-06-26 ENCOUNTER — TELEPHONE (OUTPATIENT)
Dept: INTERNAL MEDICINE CLINIC | Age: 67
End: 2017-06-26

## 2017-06-26 NOTE — TELEPHONE ENCOUNTER
Pt wants a return call from nurse Beltre ref to medication (Nexium/Prilosec) she states you were supposed to get  Some thing straight in ref to this and  she wants to speak with you.  Pt # [phone]

## 2017-06-28 ENCOUNTER — TELEPHONE (OUTPATIENT)
Dept: INTERNAL MEDICINE CLINIC | Age: 67
End: 2017-06-28

## 2017-06-28 ENCOUNTER — DOCUMENTATION ONLY (OUTPATIENT)
Dept: INTERNAL MEDICINE CLINIC | Age: 67
End: 2017-06-28

## 2017-06-28 DIAGNOSIS — R73.03 PRE-DIABETES: Primary | ICD-10-CM

## 2017-06-28 NOTE — TELEPHONE ENCOUNTER
Spoke with patient. She has been on Metformin since 5/1/17. Severe diarrhea still. She would like to try a different medication if possible. Please advise.

## 2017-06-28 NOTE — TELEPHONE ENCOUNTER
Spoke with Ms. Cady Montanez about her Rx for Nexium, Ms. Cady Montanez states that the esomeprazole isn't helping with the reflux and she would like for the Brand name to be approved by [employer]. Pt states that with the esomeprazole, she's still having the constant heartburn and nausea, the brand name works better for her. The pt also stated that [facility] no longer takes her insurance, she would like medications sent to the [facility] on [facility] and [address].

## 2017-06-28 NOTE — TELEPHONE ENCOUNTER
Pt states she can't take the medication that was recently prescribed, Metformin. Pt states this Is too harsh. She gets severe diarrhea where she can't even leave the house.   Please call pt

## 2017-06-28 NOTE — PROGRESS NOTES
Spoke with [employer], about patients medication change, Avi Winters states that they do take the patient insurance and she would like to remain at [employer] instead of switching to [employer]. Called the patient to confirm this and the patient confirms.

## 2017-06-29 RX ORDER — METFORMIN HYDROCHLORIDE 500 MG/1
500 TABLET, EXTENDED RELEASE ORAL
Qty: 30 TAB | Refills: 3 | Status: SHIPPED | OUTPATIENT
Start: 2017-06-29 | End: 2017-10-31 | Stop reason: SDUPTHER

## 2017-06-29 NOTE — TELEPHONE ENCOUNTER
Changed to a long acting formulation of metformin to take once a day that should not cause diarrhea - this should be approved by insurance since she did not tolerate metformin short acting

## 2017-06-29 NOTE — TELEPHONE ENCOUNTER
Spoke with patient and advised of medication change per Dr. Dimas Durham. All questions answered and she will  new rx.

## 2017-07-12 ENCOUNTER — TELEPHONE (OUTPATIENT)
Dept: INTERNAL MEDICINE CLINIC | Age: 67
End: 2017-07-12

## 2017-07-12 NOTE — TELEPHONE ENCOUNTER
Patient would like a tyron back from the nurse regarding her medications.  Contact is [phone]         Message received & copied from [facility]

## 2017-07-13 NOTE — TELEPHONE ENCOUNTER
Call made to patient. Advised patient to discontinue metformin per Dr. Milo Smith. Cut out sugar and carbs in diet. Patient verbalized understanding. Also, she is complaining of being bitten by an insect on her leg. She is unsure what bit her for sure. Has been about 3 weeks and states still not fully healed. Red and itchy. Advised patient to apply triple antibiotic ointment or cream. May use hydrocortisone or benadryl cream for itch. And do not scratch. If not improving, she will return call to schedule an appointment.

## 2017-10-31 DIAGNOSIS — R73.03 PRE-DIABETES: ICD-10-CM

## 2017-10-31 RX ORDER — METFORMIN HYDROCHLORIDE 500 MG/1
TABLET, EXTENDED RELEASE ORAL
Qty: 30 TAB | Refills: 3 | Status: SHIPPED | OUTPATIENT
Start: 2017-10-31

## 2019-03-07 ENCOUNTER — HOSPITAL ENCOUNTER (OUTPATIENT)
Dept: GENERAL RADIOLOGY | Age: 69
Discharge: HOME | End: 2019-03-07
Payer: MEDICARE

## 2019-03-07 DIAGNOSIS — M25.512 LEFT SHOULDER PAIN: ICD-10-CM

## 2019-03-07 PROCEDURE — 73030 X-RAY EXAM OF SHOULDER: CPT

## 2020-06-17 ENCOUNTER — HOSPITAL ENCOUNTER (EMERGENCY)
Age: 70
Discharge: HOME | End: 2020-06-17
Attending: EMERGENCY MEDICINE
Payer: MEDICARE

## 2020-06-17 ENCOUNTER — APPOINTMENT (OUTPATIENT)
Dept: CT IMAGING | Age: 70
End: 2020-06-17
Attending: NURSE PRACTITIONER
Payer: MEDICARE

## 2020-06-17 VITALS
TEMPERATURE: 97.9 F | BODY MASS INDEX: 25.18 KG/M2 | HEIGHT: 69 IN | DIASTOLIC BLOOD PRESSURE: 68 MMHG | SYSTOLIC BLOOD PRESSURE: 147 MMHG | OXYGEN SATURATION: 96 % | HEART RATE: 78 BPM | WEIGHT: 170 LBS | RESPIRATION RATE: 17 BRPM

## 2020-06-17 DIAGNOSIS — R19.7 DIARRHEA, UNSPECIFIED TYPE: Primary | ICD-10-CM

## 2020-06-17 LAB
ALBUMIN SERPL-MCNC: 4.2 G/DL (ref 3.5–5)
ALBUMIN/GLOB SERPL: 1 {RATIO} (ref 1.1–2.2)
ALP SERPL-CCNC: 83 U/L (ref 45–117)
ALT SERPL-CCNC: 35 U/L (ref 12–78)
ANION GAP SERPL CALC-SCNC: 11 MMOL/L (ref 5–15)
APPEARANCE UR: CLEAR
AST SERPL-CCNC: 28 U/L (ref 15–37)
BACTERIA URNS QL MICRO: NEGATIVE /HPF
BASOPHILS # BLD: 0 K/UL (ref 0–0.1)
BASOPHILS NFR BLD: 0 % (ref 0–1)
BILIRUB SERPL-MCNC: 0.7 MG/DL (ref 0.2–1)
BILIRUB UR QL: NEGATIVE
BUN SERPL-MCNC: 15 MG/DL (ref 6–20)
BUN/CREAT SERPL: 21 (ref 12–20)
CALCIUM SERPL-MCNC: 9.5 MG/DL (ref 8.5–10.1)
CHLORIDE SERPL-SCNC: 102 MMOL/L (ref 97–108)
CO2 SERPL-SCNC: 25 MMOL/L (ref 21–32)
COLOR UR: ABNORMAL
COMMENT, HOLDF: NORMAL
CREAT SERPL-MCNC: 0.71 MG/DL (ref 0.55–1.02)
DIFFERENTIAL METHOD BLD: ABNORMAL
EOSINOPHIL # BLD: 0 K/UL (ref 0–0.4)
EOSINOPHIL NFR BLD: 0 % (ref 0–7)
EPITH CASTS URNS QL MICRO: ABNORMAL /LPF
ERYTHROCYTE [DISTWIDTH] IN BLOOD BY AUTOMATED COUNT: 13.1 % (ref 11.5–14.5)
GLOBULIN SER CALC-MCNC: 4.2 G/DL (ref 2–4)
GLUCOSE SERPL-MCNC: 71 MG/DL (ref 65–100)
GLUCOSE UR STRIP.AUTO-MCNC: NEGATIVE MG/DL
HCT VFR BLD AUTO: 45.8 % (ref 35–47)
HGB BLD-MCNC: 14.4 G/DL (ref 11.5–16)
HGB UR QL STRIP: NEGATIVE
IMM GRANULOCYTES # BLD AUTO: 0 K/UL (ref 0–0.04)
IMM GRANULOCYTES NFR BLD AUTO: 0 % (ref 0–0.5)
KETONES UR QL STRIP.AUTO: 80 MG/DL
LACTATE SERPL-SCNC: 1.5 MMOL/L (ref 0.4–2)
LEUKOCYTE ESTERASE UR QL STRIP.AUTO: NEGATIVE
LYMPHOCYTES # BLD: 1.3 K/UL (ref 0.8–3.5)
LYMPHOCYTES NFR BLD: 14 % (ref 12–49)
MCH RBC QN AUTO: 29.8 PG (ref 26–34)
MCHC RBC AUTO-ENTMCNC: 31.4 G/DL (ref 30–36.5)
MCV RBC AUTO: 94.6 FL (ref 80–99)
MONOCYTES # BLD: 0.4 K/UL (ref 0–1)
MONOCYTES NFR BLD: 5 % (ref 5–13)
NEUTS SEG # BLD: 7.5 K/UL (ref 1.8–8)
NEUTS SEG NFR BLD: 80 % (ref 32–75)
NITRITE UR QL STRIP.AUTO: NEGATIVE
NRBC # BLD: 0 K/UL (ref 0–0.01)
NRBC BLD-RTO: 0 PER 100 WBC
PH UR STRIP: 5 [PH] (ref 5–8)
PLATELET # BLD AUTO: 156 K/UL (ref 150–400)
POTASSIUM SERPL-SCNC: 3.9 MMOL/L (ref 3.5–5.1)
PROT SERPL-MCNC: 8.4 G/DL (ref 6.4–8.2)
PROT UR STRIP-MCNC: NEGATIVE MG/DL
RBC # BLD AUTO: 4.84 M/UL (ref 3.8–5.2)
RBC #/AREA URNS HPF: ABNORMAL /HPF (ref 0–5)
SAMPLES BEING HELD,HOLD: NORMAL
SODIUM SERPL-SCNC: 138 MMOL/L (ref 136–145)
SP GR UR REFRACTOMETRY: 1.03 (ref 1–1.03)
UR CULT HOLD, URHOLD: NORMAL
UROBILINOGEN UR QL STRIP.AUTO: 0.2 EU/DL (ref 0.2–1)
WBC # BLD AUTO: 9.3 K/UL (ref 3.6–11)
WBC URNS QL MICRO: ABNORMAL /HPF (ref 0–4)

## 2020-06-17 PROCEDURE — 74011250636 HC RX REV CODE- 250/636: Performed by: NURSE PRACTITIONER

## 2020-06-17 PROCEDURE — 96374 THER/PROPH/DIAG INJ IV PUSH: CPT

## 2020-06-17 PROCEDURE — 80053 COMPREHEN METABOLIC PANEL: CPT

## 2020-06-17 PROCEDURE — 83605 ASSAY OF LACTIC ACID: CPT

## 2020-06-17 PROCEDURE — 93005 ELECTROCARDIOGRAM TRACING: CPT

## 2020-06-17 PROCEDURE — 36415 COLL VENOUS BLD VENIPUNCTURE: CPT

## 2020-06-17 PROCEDURE — 81001 URINALYSIS AUTO W/SCOPE: CPT

## 2020-06-17 PROCEDURE — 99284 EMERGENCY DEPT VISIT MOD MDM: CPT

## 2020-06-17 PROCEDURE — 85025 COMPLETE CBC W/AUTO DIFF WBC: CPT

## 2020-06-17 RX ORDER — ONDANSETRON 2 MG/ML
4 INJECTION INTRAMUSCULAR; INTRAVENOUS
Status: COMPLETED | OUTPATIENT
Start: 2020-06-17 | End: 2020-06-17

## 2020-06-17 RX ORDER — ONDANSETRON 4 MG/1
4 TABLET, FILM COATED ORAL
Qty: 10 TAB | Refills: 0 | Status: SHIPPED | OUTPATIENT
Start: 2020-06-17

## 2020-06-17 RX ADMIN — ONDANSETRON 4 MG: 2 INJECTION INTRAMUSCULAR; INTRAVENOUS at 15:58

## 2020-06-17 NOTE — ED TRIAGE NOTES
Pt c/o diarrhea x 1 month everyday after eating, 6 lb reported wt loss. Epigastric pain and nausea. Denies vomiting. Denies blood in stool or urinary sxs.

## 2020-06-17 NOTE — ED NOTES
Discharge instructions given to pt by RN. Pt educated on prescribed medications in teach back method and verbalizes understanding. Opportunity for questions provided.  Pt ambulatory out of unit with walker, in no acute distress and taken home by friend

## 2020-06-17 NOTE — ED PROVIDER NOTES
68-year-old -American female presents ambulatory to the [facility] emergency department with past medical history of arthritis, bipolar 1, depression, hypertension, and auditory hallucinations complains of diarrhea x1 month. Patient reports 4 loose episodes of stool a day, states that she was taking Pepto-Bismol for 3 weeks with minimal relief, but stopped over the last week. Patient denies seeing blood in her urine or stool. Patient denies previous abdominal surgery. Patient reports occasional nausea, denies vomiting. Patient reports losing 6 pounds over the last last month, states that she is able to eat in small amounts. Patient denies burning in her chest region, states that she feels crampy in her abdomen, pain relieved after she defecates. Patient denies ever seeing a gastroenterologist MD. Patient denies fever, chills, headache, lightheaded, chest pain, shortness of breath, vomiting. Denies regular ibuprofen/ naprosyn use.             Past Medical History:   Diagnosis Date    Arthritis     Bilateral Knee     Bipolar I disorder with mood-congruent psychotic features ([address]     Depressed     Depression     Hypertension     Psychotic disorder ([facility] [address]     Auditory Hallucinations        Past Surgical History:   Procedure Laterality Date    BREAST SURGERY PROCEDURE UNLISTED Left     Mass/Lump Removal     HX BREAST LUMPECTOMY      HX ORTHOPAEDIC      HX ORTHOPAEDIC      Pin Placed In (R) Shoulder         Family History:   Problem Relation Age of Onset    Cancer Mother         Breast Cancer    Cancer Father         Prostate/Throat Cancer    Cancer Paternal Aunt         Breast Cancer    Diabetes Paternal Aunt         Amputee    Cancer Paternal Grandmother         Breast Cancer       Social History     Socioeconomic History    Marital status: LEGALLY      Spouse name: Not on file    Number of children: Not on file    Years of education: Not on file    Highest education level: Not on file   Occupational History    Not on file   Social Needs    Financial resource strain: Not on file    Food insecurity     Worry: Not on file     Inability: Not on file    Transportation needs     Medical: Not on file     Non-medical: Not on file   Tobacco Use    Smoking status: Former Smoker    Smokeless tobacco: Never Used   Substance and Sexual Activity    Alcohol use: No     Alcohol/week: 0.0 standard drinks    Drug use: No    Sexual activity: Yes     Birth control/protection: None   Lifestyle    Physical activity     Days per week: Not on file     Minutes per session: Not on file    Stress: Not on file   Relationships    Social connections     Talks on phone: Not on file     Gets together: Not on file     Attends Holiness service: Not on file     Active member of club or organization: Not on file     Attends meetings of clubs or organizations: Not on file     Relationship status: Not on file    Intimate partner violence     Fear of current or ex partner: Not on file     Emotionally abused: Not on file     Physically abused: Not on file     Forced sexual activity: Not on file   Other Topics Concern    Not on file   Social History Narrative    ** Merged History Encounter **              ALLERGIES: Sulfa (sulfonamide antibiotics) and Sulfa (sulfonamide antibiotics)    Review of Systems   Constitutional: Positive for appetite change and fatigue. Negative for fever. HENT: Negative for sore throat. Eyes: Negative for visual disturbance. Respiratory: Negative for shortness of breath and wheezing. Cardiovascular: Negative for chest pain. Gastrointestinal: Positive for abdominal pain, diarrhea and nausea. Negative for vomiting. Genitourinary: Negative for difficulty urinating, dysuria, frequency, hematuria and urgency. Musculoskeletal: Negative for arthralgias, gait problem and myalgias. Skin: Negative for color change and rash.    Neurological: Negative for dizziness, weakness, light-headedness and headaches. Psychiatric/Behavioral: Negative. Vitals:    06/17/20 1412 06/17/20 1600   BP: 149/79 147/68   Pulse: 96 78   Resp: 16 17   Temp: 97.9 °F (36.6 °C)    SpO2: 98% 96%   Weight: 77.1 kg (170 lb)    Height: 5' 9\" (1.753 m)             Physical Exam  Vitals signs and nursing note reviewed. Constitutional:       General: She is not in acute distress. Appearance: Normal appearance. She is not ill-appearing. HENT:      Head: Normocephalic and atraumatic. Nose: Nose normal.      Mouth/Throat:      Mouth: Mucous membranes are moist.   Eyes:      Pupils: Pupils are equal, round, and reactive to light. Neck:      Musculoskeletal: Normal range of motion. Cardiovascular:      Rate and Rhythm: Normal rate and regular rhythm. Pulses: Normal pulses. Heart sounds: Normal heart sounds. Pulmonary:      Effort: Pulmonary effort is normal.      Breath sounds: Normal breath sounds. No wheezing. Abdominal:      General: Abdomen is flat. Bowel sounds are normal.      Palpations: Abdomen is soft. Tenderness: There is abdominal tenderness in the epigastric area and periumbilical area. There is no right CVA tenderness, left CVA tenderness, guarding or rebound. Negative signs include Ponce's sign and McBurney's sign. Musculoskeletal: Normal range of motion. Skin:     General: Skin is warm and dry. Neurological:      General: No focal deficit present. Mental Status: She is alert and oriented to person, place, and time. Psychiatric:         Attention and Perception: Attention normal.         Mood and Affect: Affect is flat. Speech: Speech normal.         Behavior: Behavior normal. Behavior is cooperative. Thought Content:  Thought content normal.          MDM  Number of Diagnoses or Management Options     Amount and/or Complexity of Data Reviewed  Clinical lab tests: reviewed  Tests in the medicine section of CPT®: reviewed      VITAL SIGNS:  Patient Vitals for the past 4 hrs:   Temp Pulse Resp BP SpO2   06/17/20 1600  78 17 147/68 96 %   06/17/20 1412 97.9 °F (36.6 °C) 96 16 149/79 98 %         LABS:  Recent Results (from the past 6 hour(s))   CBC WITH AUTOMATED DIFF    Collection Time: 06/17/20  2:28 PM   Result Value Ref Range    WBC 9.3 3.6 - 11.0 K/uL    RBC 4.84 3.80 - 5.20 M/uL    HGB 14.4 11.5 - 16.0 g/dL    HCT 45.8 35.0 - 47.0 %    MCV 94.6 80.0 - 99.0 FL    MCH 29.8 26.0 - 34.0 PG    MCHC 31.4 30.0 - 36.5 g/dL    RDW 13.1 11.5 - 14.5 %    PLATELET 010 509 - 955 K/uL    NRBC 0.0 0  WBC    ABSOLUTE NRBC 0.00 0.00 - 0.01 K/uL    NEUTROPHILS 80 (H) 32 - 75 %    LYMPHOCYTES 14 12 - 49 %    MONOCYTES 5 5 - 13 %    EOSINOPHILS 0 0 - 7 %    BASOPHILS 0 0 - 1 %    IMMATURE GRANULOCYTES 0 0.0 - 0.5 %    ABS. NEUTROPHILS 7.5 1.8 - 8.0 K/UL    ABS. LYMPHOCYTES 1.3 0.8 - 3.5 K/UL    ABS. MONOCYTES 0.4 0.0 - 1.0 K/UL    ABS. EOSINOPHILS 0.0 0.0 - 0.4 K/UL    ABS. BASOPHILS 0.0 0.0 - 0.1 K/UL    ABS. IMM. GRANS. 0.0 0.00 - 0.04 K/UL    DF AUTOMATED     METABOLIC PANEL, COMPREHENSIVE    Collection Time: 06/17/20  2:28 PM   Result Value Ref Range    Sodium 138 136 - 145 mmol/L    Potassium 3.9 3.5 - 5.1 mmol/L    Chloride 102 97 - 108 mmol/L    CO2 25 21 - 32 mmol/L    Anion gap 11 5 - 15 mmol/L    Glucose 71 65 - 100 mg/dL    BUN 15 6 - 20 MG/DL    Creatinine 0.71 0.55 - 1.02 MG/DL    BUN/Creatinine ratio 21 (H) 12 - 20      GFR est AA >60 >60 ml/min/1.73m2    GFR est non-AA >60 >60 ml/min/1.73m2    Calcium 9.5 8.5 - 10.1 MG/DL    Bilirubin, total 0.7 0.2 - 1.0 MG/DL    ALT (SGPT) 35 12 - 78 U/L    AST (SGOT) 28 15 - 37 U/L    Alk.  phosphatase 83 45 - 117 U/L    Protein, total 8.4 (H) 6.4 - 8.2 g/dL    Albumin 4.2 3.5 - 5.0 g/dL    Globulin 4.2 (H) 2.0 - 4.0 g/dL    A-G Ratio 1.0 (L) 1.1 - 2.2     SAMPLES BEING HELD    Collection Time: 06/17/20  2:28 PM   Result Value Ref Range    SAMPLES BEING HELD 1RED, 1BLU     COMMENT        Add-on orders for these samples will be processed based on acceptable specimen integrity and analyte stability, which may vary by analyte. EKG, 12 LEAD, INITIAL    Collection Time: 06/17/20  2:44 PM   Result Value Ref Range    Ventricular Rate 78 BPM    Atrial Rate 78 BPM    P-R Interval 136 ms    QRS Duration 74 ms    Q-T Interval 400 ms    QTC Calculation (Bezet) 456 ms    Calculated P Axis 56 degrees    Calculated R Axis 9 degrees    Calculated T Axis 37 degrees    Diagnosis       Normal sinus rhythm  When compared with ECG of 07-APR-2016 11:09,  T wave inversion no longer evident in Anterolateral leads     LACTIC ACID    Collection Time: 06/17/20  3:05 PM   Result Value Ref Range    Lactic acid 1.5 0.4 - 2.0 MMOL/L   URINALYSIS W/MICROSCOPIC    Collection Time: 06/17/20  3:05 PM   Result Value Ref Range    Color YELLOW/STRAW      Appearance CLEAR CLEAR      Specific gravity 1.026 1.003 - 1.030      pH (UA) 5.0 5.0 - 8.0      Protein Negative NEG mg/dL    Glucose Negative NEG mg/dL    Ketone 80 (A) NEG mg/dL    Bilirubin Negative NEG      Blood Negative NEG      Urobilinogen 0.2 0.2 - 1.0 EU/dL    Nitrites Negative NEG      Leukocyte Esterase Negative NEG      WBC 0-4 0 - 4 /hpf    RBC 0-5 0 - 5 /hpf    Epithelial cells FEW FEW /lpf    Bacteria Negative NEG /hpf   URINE CULTURE HOLD SAMPLE    Collection Time: 06/17/20  3:05 PM   Result Value Ref Range    Urine culture hold        Urine on hold in Microbiology dept for 2 days. If unpreserved urine is submitted, it cannot be used for addtional testing after 24 hours, recollection will be required. IMAGING:  No orders to display   CT Discontinued. Medications During Visit:  Medications   ondansetron (ZOFRAN) injection 4 mg (4 mg IntraVENous Given 6/17/20 9254)         DECISION MAKING:  Shruti Flanagan is a 78 y.o. female who comes in as above.    Possible: peptic ulcer vs. UTI vs. Dehydration vs. Malnutrition  Plan: cbc/cmp/lactic acid, urine, CT abd/pelv, medicate for nausea, will PO challenge after CT results. Symptoms possibly related to IBS, discussed with patient that this would be better handled by a GI specialist.  Patient denies burning, or symptoms of heartburn, states that she gets nauseated mildly, with no vomiting. Discussed diet changes with patient, to include high-protein meats, to help with symptoms. Patient states that she gets a cramping sensation prior to bowel movement, and once she defecates pain is relieved. Labs and imaging discussed with patient, labs unremarkable, showing no signs of dehydration. Patient is hemodynamically stable, afebrile. Patient ambulating independently with rolling walker from home with steady gait observed. Re-evaluation: Patient tolerating p.o. intake, discussed plan for importance for patient to follow-up with GI specialist.  Patient verbalizes understanding and agrees with plan to discharge home with follow-up. Patient discharged home with Rx for Zofran. IMPRESSION:  1. Diarrhea, unspecified type        DISPOSITION:  Discharged home      Discharge Medication List as of 6/17/2020  4:04 PM      START taking these medications    Details   ondansetron hcl (Zofran) 4 mg tablet Take 1 Tab by mouth every eight (8) hours as needed for Nausea or Vomiting., Print, Disp-10 Tab, R-0         CONTINUE these medications which have NOT CHANGED    Details   metFORMIN ER (GLUCOPHAGE XR) 500 mg tablet take 1 tablet by mouth once daily with DINNER, Normal, Disp-30 Tab, R-3      esomeprazole (NEXIUM) 40 mg capsule Take 1 Cap by mouth daily. , Normal, Disp-30 Cap, R-5      amitriptyline (ELAVIL) 150 mg tablet Take 150 mg by mouth nightly., Historical Med      gabapentin (NEURONTIN) 300 mg capsule Take 2 Caps by mouth three (3) times daily. , Normal, Disp-180 Cap, R-11      naproxen (NAPROSYN) 500 mg tablet Take 1 Tab by mouth two (2) times daily (with meals). , Normal, Disp-60 Tab, R-11      LORazepam (ATIVAN) 1 mg tablet Take one tablet by mouth prior to EMG procedure., Print, Disp-1 Tab, R-0      food supplemt, lactose-reduced (ENSURE COMPLETE) 0.05-1.5 gram-kcal/mL liqd Take 1 Can by mouth three (3) times daily (with meals). , Print, Disp-90 Can, R-1      risperiDONE (RISPERDAL) 3 mg tablet Take 6 mg by mouth nightly., Historical Med      sertraline (ZOLOFT) 100 mg tablet Take 200 mg by mouth daily. , Historical Med              Follow-up Information     Follow up With Specialties Details Why Contact Info    Lisset Hoang MD Internal Medicine Schedule an appointment as soon as possible for a visit in 1 week  [address] [facility]  [phone]      [facility]  Call in 1 day  [place] [address].  [phone]            The patient is asked to follow-up with their primary care provider in the next several days. They are to call tomorrow for an appointment. The patient is asked to return promptly for any increased concerns or worsening of symptoms. They can return to this emergency department or any other emergency department. ED Course as of Jun 17 1753 Wed Jun 17, 2020   1553 Ketone(!): [de-identified] [AF]      ED Course User Index  [AF] Nurys Crowder, David Ferrara NP       Procedures    Discussed patient care with Dale Hurst. Attending saw and evaluated the patient. Agrees with care plan as discussed.   Cyndi Choe NP  3:53 PM

## 2020-06-18 ENCOUNTER — PATIENT OUTREACH (OUTPATIENT)
Dept: INTERNAL MEDICINE CLINIC | Age: 70
End: 2020-06-18

## 2020-06-18 LAB
ATRIAL RATE: 78 BPM
CALCULATED P AXIS, ECG09: 56 DEGREES
CALCULATED R AXIS, ECG10: 9 DEGREES
CALCULATED T AXIS, ECG11: 37 DEGREES
DIAGNOSIS, 93000: NORMAL
P-R INTERVAL, ECG05: 136 MS
Q-T INTERVAL, ECG07: 400 MS
QRS DURATION, ECG06: 74 MS
QTC CALCULATION (BEZET), ECG08: 456 MS
VENTRICULAR RATE, ECG03: 78 BPM

## 2020-06-18 NOTE — PROGRESS NOTES
ED 2020:  Vomiting/diarrhea/weakness   Patient contacted regarding recent discharge and COVID-19 risk. Discussed COVID-19 related testing which was not done at this time. Test results were not done. Patient informed of results, if available? no    Care Transition Nurse/ Ambulatory Care Manager contacted the patient by telephone to perform post discharge assessment. Verified name and  with patient as identifiers. Patient has following risk factors of: diabetes. CTN/ACM reviewed discharge instructions, medical action plan and red flags related to discharge diagnosis. Reviewed and educated them on any new and changed medications related to discharge diagnosis. Advised obtaining a 90-day supply of all daily and as-needed medications. Education provided regarding infection prevention, and signs and symptoms of COVID-19 and when to seek medical attention with patient who verbalized understanding. Discussed exposure protocols and quarantine from [address] you at higher risk for severe illness  and given an opportunity for questions and concerns. The patient agrees to contact the COVID-19 hotline [phone] or PCP office for questions related to their healthcare. CTN/ACM provided contact information for future reference. ACP:  Healthcare Decision Maker remains Brittney Nash. From CDC: Are you at higher risk for severe illness?  Wash your hands often.  Avoid close contact (6 feet, which is about two arm lengths) with people who are sick.  Put distance between yourself and other people if COVID-19 is spreading in your community.  Clean and disinfect frequently touched surfaces.  Avoid all cruise travel and non-essential air travel.  Call your healthcare professional if you have concerns about COVID-19 and your underlying condition or if you are sick.     For more information on steps you can take to protect yourself, see CDC's How to Protect Yourself Patient/family/caregiver given information for [employer] and agrees to enroll no  Plan for follow-up call in 7-14 days based on severity of symptoms and risk factors.

## 2020-07-15 ENCOUNTER — APPOINTMENT (OUTPATIENT)
Dept: CT IMAGING | Age: 70
End: 2020-07-15
Attending: EMERGENCY MEDICINE
Payer: MEDICARE

## 2020-07-15 ENCOUNTER — HOSPITAL ENCOUNTER (EMERGENCY)
Age: 70
Discharge: HOME | End: 2020-07-15
Attending: EMERGENCY MEDICINE
Payer: MEDICARE

## 2020-07-15 VITALS
DIASTOLIC BLOOD PRESSURE: 75 MMHG | RESPIRATION RATE: 16 BRPM | HEIGHT: 69 IN | BODY MASS INDEX: 25.62 KG/M2 | HEART RATE: 82 BPM | TEMPERATURE: 98.3 F | WEIGHT: 173 LBS | SYSTOLIC BLOOD PRESSURE: 156 MMHG | OXYGEN SATURATION: 96 %

## 2020-07-15 DIAGNOSIS — R19.7 DIARRHEA, UNSPECIFIED TYPE: Primary | ICD-10-CM

## 2020-07-15 LAB
ALBUMIN SERPL-MCNC: 3.5 G/DL (ref 3.5–5)
ALBUMIN/GLOB SERPL: 1 {RATIO} (ref 1.1–2.2)
ALP SERPL-CCNC: 66 U/L (ref 45–117)
ALT SERPL-CCNC: 20 U/L (ref 12–78)
ANION GAP SERPL CALC-SCNC: 10 MMOL/L (ref 5–15)
AST SERPL-CCNC: 22 U/L (ref 15–37)
BASOPHILS # BLD: 0 K/UL (ref 0–0.1)
BASOPHILS NFR BLD: 1 % (ref 0–1)
BILIRUB SERPL-MCNC: 0.4 MG/DL (ref 0.2–1)
BUN SERPL-MCNC: 12 MG/DL (ref 6–20)
BUN/CREAT SERPL: 14 (ref 12–20)
CALCIUM SERPL-MCNC: 8.8 MG/DL (ref 8.5–10.1)
CHLORIDE SERPL-SCNC: 106 MMOL/L (ref 97–108)
CO2 SERPL-SCNC: 29 MMOL/L (ref 21–32)
CREAT SERPL-MCNC: 0.83 MG/DL (ref 0.55–1.02)
DIFFERENTIAL METHOD BLD: ABNORMAL
EOSINOPHIL # BLD: 0.1 K/UL (ref 0–0.4)
EOSINOPHIL NFR BLD: 1 % (ref 0–7)
ERYTHROCYTE [DISTWIDTH] IN BLOOD BY AUTOMATED COUNT: 13.1 % (ref 11.5–14.5)
GLOBULIN SER CALC-MCNC: 3.5 G/DL (ref 2–4)
GLUCOSE SERPL-MCNC: 90 MG/DL (ref 65–100)
HCT VFR BLD AUTO: 38.9 % (ref 35–47)
HGB BLD-MCNC: 12.4 G/DL (ref 11.5–16)
IMM GRANULOCYTES # BLD AUTO: 0 K/UL (ref 0–0.04)
IMM GRANULOCYTES NFR BLD AUTO: 0 % (ref 0–0.5)
LYMPHOCYTES # BLD: 1.1 K/UL (ref 0.8–3.5)
LYMPHOCYTES NFR BLD: 18 % (ref 12–49)
MAGNESIUM SERPL-MCNC: 1.6 MG/DL (ref 1.6–2.4)
MCH RBC QN AUTO: 29.7 PG (ref 26–34)
MCHC RBC AUTO-ENTMCNC: 31.9 G/DL (ref 30–36.5)
MCV RBC AUTO: 93.3 FL (ref 80–99)
MONOCYTES # BLD: 0.3 K/UL (ref 0–1)
MONOCYTES NFR BLD: 5 % (ref 5–13)
NEUTS SEG # BLD: 4.8 K/UL (ref 1.8–8)
NEUTS SEG NFR BLD: 76 % (ref 32–75)
NRBC # BLD: 0 K/UL (ref 0–0.01)
NRBC BLD-RTO: 0 PER 100 WBC
PLATELET # BLD AUTO: 114 K/UL (ref 150–400)
POTASSIUM SERPL-SCNC: 3.5 MMOL/L (ref 3.5–5.1)
PROT SERPL-MCNC: 7 G/DL (ref 6.4–8.2)
RBC # BLD AUTO: 4.17 M/UL (ref 3.8–5.2)
SODIUM SERPL-SCNC: 145 MMOL/L (ref 136–145)
WBC # BLD AUTO: 6.3 K/UL (ref 3.6–11)

## 2020-07-15 PROCEDURE — 74177 CT ABD & PELVIS W/CONTRAST: CPT

## 2020-07-15 PROCEDURE — 85025 COMPLETE CBC W/AUTO DIFF WBC: CPT

## 2020-07-15 PROCEDURE — 74011636320 HC RX REV CODE- 636/320: Performed by: EMERGENCY MEDICINE

## 2020-07-15 PROCEDURE — 96360 HYDRATION IV INFUSION INIT: CPT

## 2020-07-15 PROCEDURE — 99284 EMERGENCY DEPT VISIT MOD MDM: CPT

## 2020-07-15 PROCEDURE — 36415 COLL VENOUS BLD VENIPUNCTURE: CPT

## 2020-07-15 PROCEDURE — 83735 ASSAY OF MAGNESIUM: CPT

## 2020-07-15 PROCEDURE — 80053 COMPREHEN METABOLIC PANEL: CPT

## 2020-07-15 PROCEDURE — 96361 HYDRATE IV INFUSION ADD-ON: CPT

## 2020-07-15 PROCEDURE — 74011250636 HC RX REV CODE- 250/636: Performed by: EMERGENCY MEDICINE

## 2020-07-15 RX ORDER — LOPERAMIDE HYDROCHLORIDE 2 MG/1
2 CAPSULE ORAL
Qty: 20 CAP | Refills: 0 | Status: SHIPPED | OUTPATIENT
Start: 2020-07-15

## 2020-07-15 RX ORDER — OMEPRAZOLE 40 MG/1
40 CAPSULE, DELAYED RELEASE ORAL DAILY
COMMUNITY

## 2020-07-15 RX ORDER — MIRTAZAPINE 15 MG/1
TABLET, FILM COATED ORAL
COMMUNITY

## 2020-07-15 RX ORDER — SODIUM CHLORIDE 0.9 % (FLUSH) 0.9 %
10 SYRINGE (ML) INJECTION
Status: COMPLETED | OUTPATIENT
Start: 2020-07-15 | End: 2020-07-15

## 2020-07-15 RX ORDER — DONEPEZIL HYDROCHLORIDE 10 MG/1
10 TABLET, FILM COATED ORAL
COMMUNITY

## 2020-07-15 RX ORDER — MEMANTINE HYDROCHLORIDE 10 MG/1
10 TABLET ORAL DAILY
COMMUNITY

## 2020-07-15 RX ADMIN — Medication 10 ML: at 15:52

## 2020-07-15 RX ADMIN — IOPAMIDOL 100 ML: 755 INJECTION, SOLUTION INTRAVENOUS at 15:52

## 2020-07-15 RX ADMIN — SODIUM CHLORIDE 1000 ML: 900 INJECTION, SOLUTION INTRAVENOUS at 14:59

## 2020-07-15 NOTE — ED NOTES
Discharge instructions were given to the patient by Malachi Bhatt. The patient left the Emergency Department ambulatory, alert and oriented and in no acute distress with 1 prescriptions. The patient was encouraged to call or return to the ED for worsening issues or problems and was encouraged to schedule a follow up appointment for continuing care. The patient verbalized understanding of discharge instructions and prescriptions, all questions were answered. The patient has no further concerns at this time.

## 2020-07-15 NOTE — ED TRIAGE NOTES
Pt reports diarrhea for 1 month. Pt reports having taken imodium in the past and that working but she stopped two weeks ago and diarrhea has persisted. Pt reports 40 lbs weight loss over 3 months time.  Has an appointment for colonoscopy in August.

## 2020-07-15 NOTE — DISCHARGE INSTRUCTIONS

## 2020-07-15 NOTE — ED NOTES
Pt presents to ED ambulatory complaining of diarrhea, weakness in knees, and significant weight loss x 2 or 3 months. Pt is alert and oriented x 4, RR even and unlabored, skin is warm and dry. Assessment completed and pt updated on plan of care. Call bell in reach. Emergency Department Nursing Plan of Care       The Nursing Plan of Care is developed from the Nursing assessment and Emergency Department Attending provider initial evaluation. The plan of care may be reviewed in the ED Provider note.     The Plan of Care was developed with the following considerations:   Patient / Family readiness to learn indicated by:verbalized understanding  Persons(s) to be included in education: patient  Barriers to Learning/Limitations:No    Signed     Benjamen [facility]    7/15/2020   2:15 PM

## 2020-07-16 ENCOUNTER — PATIENT OUTREACH (OUTPATIENT)
Dept: CASE MANAGEMENT | Age: 70
End: 2020-07-16

## 2022-04-04 ENCOUNTER — TRANSCRIBE ORDER (OUTPATIENT)
Dept: SCHEDULING | Age: 72
End: 2022-04-04

## 2022-04-04 DIAGNOSIS — N63.10 BREAST MASS, RIGHT: Primary | ICD-10-CM

## 2022-04-08 ENCOUNTER — TRANSCRIBE ORDER (OUTPATIENT)
Dept: SCHEDULING | Age: 72
End: 2022-04-08

## 2022-04-08 DIAGNOSIS — N63.10 BREAST MASS, RIGHT: Primary | ICD-10-CM

## 2022-04-20 ENCOUNTER — TRANSCRIBE ORDER (OUTPATIENT)
Dept: SCHEDULING | Age: 72
End: 2022-04-20

## 2022-04-20 DIAGNOSIS — N63.12 UNSPECIFIED LUMP IN THE RIGHT BREAST, UPPER INNER QUADRANT: Primary | ICD-10-CM

## 2022-11-19 ENCOUNTER — HOSPITAL ENCOUNTER (EMERGENCY)
Age: 72
Discharge: HOME | End: 2022-11-19
Attending: EMERGENCY MEDICINE
Payer: MEDICARE

## 2022-11-19 VITALS
BODY MASS INDEX: 27.4 KG/M2 | DIASTOLIC BLOOD PRESSURE: 59 MMHG | HEIGHT: 69 IN | TEMPERATURE: 98.3 F | RESPIRATION RATE: 16 BRPM | WEIGHT: 185 LBS | SYSTOLIC BLOOD PRESSURE: 117 MMHG | OXYGEN SATURATION: 96 % | HEART RATE: 92 BPM

## 2022-11-19 DIAGNOSIS — N39.0 URINARY TRACT INFECTION WITHOUT HEMATURIA, SITE UNSPECIFIED: Primary | ICD-10-CM

## 2022-11-19 DIAGNOSIS — R31.9 HEMATURIA, UNSPECIFIED TYPE: ICD-10-CM

## 2022-11-19 DIAGNOSIS — R39.9 LOWER URINARY TRACT SYMPTOMS (LUTS): ICD-10-CM

## 2022-11-19 LAB
APPEARANCE UR: ABNORMAL
BACTERIA URNS QL MICRO: NEGATIVE /HPF
BILIRUB UR QL: NEGATIVE
COLOR UR: ABNORMAL
EPITH CASTS URNS QL MICRO: ABNORMAL /LPF
GLUCOSE UR STRIP.AUTO-MCNC: NEGATIVE MG/DL
HGB UR QL STRIP: ABNORMAL
KETONES UR QL STRIP.AUTO: ABNORMAL MG/DL
LEUKOCYTE ESTERASE UR QL STRIP.AUTO: ABNORMAL
NITRITE UR QL STRIP.AUTO: NEGATIVE
PH UR STRIP: 5.5 [PH] (ref 5–8)
PROT UR STRIP-MCNC: 100 MG/DL
RBC #/AREA URNS HPF: ABNORMAL /HPF (ref 0–5)
SP GR UR REFRACTOMETRY: 1.02
UA: UC IF INDICATED,UAUC: ABNORMAL
UROBILINOGEN UR QL STRIP.AUTO: 1 EU/DL (ref 0.2–1)
WBC URNS QL MICRO: ABNORMAL /HPF (ref 0–4)

## 2022-11-19 PROCEDURE — 99283 EMERGENCY DEPT VISIT LOW MDM: CPT

## 2022-11-19 PROCEDURE — 87077 CULTURE AEROBIC IDENTIFY: CPT

## 2022-11-19 PROCEDURE — 81001 URINALYSIS AUTO W/SCOPE: CPT

## 2022-11-19 PROCEDURE — 87186 SC STD MICRODIL/AGAR DIL: CPT

## 2022-11-19 PROCEDURE — 74011250637 HC RX REV CODE- 250/637: Performed by: EMERGENCY MEDICINE

## 2022-11-19 PROCEDURE — 87086 URINE CULTURE/COLONY COUNT: CPT

## 2022-11-19 RX ORDER — CEPHALEXIN 500 MG/1
500 CAPSULE ORAL
Status: COMPLETED | OUTPATIENT
Start: 2022-11-19 | End: 2022-11-19

## 2022-11-19 RX ORDER — CEPHALEXIN 500 MG/1
500 CAPSULE ORAL 4 TIMES DAILY
Qty: 28 CAPSULE | Refills: 0 | Status: SHIPPED | OUTPATIENT
Start: 2022-11-19 | End: 2022-11-26

## 2022-11-19 RX ADMIN — CEPHALEXIN 500 MG: 500 CAPSULE ORAL at 21:45

## 2022-11-20 NOTE — ED NOTES
Emergency Department Nursing Plan of Care       The Nursing Plan of Care is developed from the Nursing assessment and Emergency Department Attending provider initial evaluation. The plan of care may be reviewed in the ED Provider note.     The Plan of Care was developed with the following considerations:   Patient / Family readiness to learn indicated by:verbalized understanding  Persons(s) to be included in education: patient  Barriers to Learning/Limitations:No    Signed     Jaynee Castleman, RN    11/19/2022   9:08 PM

## 2022-11-20 NOTE — ED TRIAGE NOTES
Reports hematuria noted yesterday.   Also reports having a sensation that travels from lower abd up and into left arm and hand

## 2022-11-20 NOTE — ED PROVIDER NOTES
EMERGENCY DEPARTMENT HISTORY AND PHYSICAL EXAM               Please note that this dictation was completed with the assistance of \"Dragon\", the computer voice recognition software. Quite often unanticipated grammatical, syntax, homophones, and other interpretive errors are inadvertently transcribed by the computer software. Please disregard these errors and any errors that have escaped final proofreading. Thank you. Date: 11/19/22  Patient: Radha Rajan  Patient Age and Sex: 81 y.o. female   MRN: [id number]  CSN: [id number]    History of Presenting Illness     Chief Complaint   Patient presents with    UTI     History Provided By: Patient/family/EMS (if applicable)    HPI: Radha Rajan, 81 y.o. female with past medical history as documented below presents to the ED with c/o of several days of mild to moderate hematuria, urinary frequency and urgency. Patient has a history of UTIs and states symptoms feel similar. Patient denies any abdominal pain. No nausea or vomiting. No fevers. Patient has taken no medications yet for symptoms. Pt denies any other exacerbating or ameliorating factors. Pt specifically denies any recent fever, chills, headache, nausea, vomiting, abdominal pain, CP, SOB, lightheadedness, dizziness, numbness, weakness, lower extremity swelling, heart palpitations, diarrhea, constipation, melena, hematochezia, cough, or congestion. There are no other complaints, changes or physical findings pertinent to the HPI at this time.     PCP: Tanmay Tello NP  Past History   Past Medical History:  Past Medical History:   Diagnosis Date    Arthritis     Bilateral Knee     Bipolar I disorder with mood-congruent psychotic features ([address]     Depressed     Depression     Hypertension     Psychotic disorder ([address]     Auditory Hallucinations        Past Surgical History:  Past Surgical History:   Procedure Laterality Date    HX BREAST LUMPECTOMY      HX ORTHOPAEDIC      HX ORTHOPAEDIC      Pin Placed In (R) Shoulder    ID BREAST SURGERY PROCEDURE UNLISTED Left     Mass/Lump Removal        Family History:   Family history reviewed and was non-contributory, unless specified below:  Family History   Problem Relation Age of Onset    Cancer Mother         Breast Cancer    Cancer Father         Prostate/Throat Cancer    Cancer Paternal Aunt         Breast Cancer    Diabetes Paternal Aunt         Amputee    Cancer Paternal Grandmother         Breast Cancer       Social History:  Social History     Tobacco Use    Smoking status: Former    Smokeless tobacco: Never   Substance Use Topics    Alcohol use: Yes     Comment: wine    Drug use: Yes     Types: Marijuana       Allergies: Allergies   Allergen Reactions    Sulfa (Sulfonamide Antibiotics) Itching and Swelling    Sulfa (Sulfonamide Antibiotics) Hives       Current Medications:  No current facility-administered medications on file prior to encounter. Current Outpatient Medications on File Prior to Encounter   Medication Sig Dispense Refill    mirtazapine (REMERON) 15 mg tablet Take  by mouth nightly. memantine (NAMENDA) 10 mg tablet Take 10 mg by mouth daily. donepeziL (ARICEPT) 10 mg tablet Take 10 mg by mouth nightly. omeprazole (PRILOSEC) 40 mg capsule Take 40 mg by mouth daily. loperamide (IMODIUM) 2 mg capsule Take 1 Cap by mouth two (2) times daily as needed for Diarrhea. 20 Cap 0    ondansetron hcl (Zofran) 4 mg tablet Take 1 Tab by mouth every eight (8) hours as needed for Nausea or Vomiting. 10 Tab 0    metFORMIN ER (GLUCOPHAGE XR) 500 mg tablet take 1 tablet by mouth once daily with DINNER 30 Tab 3    esomeprazole (NEXIUM) 40 mg capsule Take 1 Cap by mouth daily. 30 Cap 5    amitriptyline (ELAVIL) 150 mg tablet Take 150 mg by mouth nightly.      gabapentin (NEURONTIN) 300 mg capsule Take 2 Caps by mouth three (3) times daily. 180 Cap 11    naproxen (NAPROSYN) 500 mg tablet Take 1 Tab by mouth two (2) times daily (with meals). 60 Tab 11    LORazepam (ATIVAN) 1 mg tablet Take one tablet by mouth prior to EMG procedure. 1 Tab 0    food supplemt, lactose-reduced (ENSURE COMPLETE) 0.05-1.5 gram-kcal/mL liqd Take 1 Can by mouth three (3) times daily (with meals). 90 Can 1    risperiDONE (RISPERDAL) 3 mg tablet Take 6 mg by mouth nightly. sertraline (ZOLOFT) 100 mg tablet Take 200 mg by mouth daily. Review of Systems   A complete ROS was reviewed by me today and all other systems negative, unless otherwise specified below:  Review of Systems   Constitutional: Negative. Negative for chills and fever. HENT: Negative. Negative for congestion and sore throat. Eyes: Negative. Respiratory: Negative. Negative for cough, chest tightness, shortness of breath and wheezing. Cardiovascular: Negative. Negative for chest pain, palpitations and leg swelling. Gastrointestinal: Negative. Negative for abdominal distention, abdominal pain, blood in stool, constipation, diarrhea, nausea and vomiting. Endocrine: Negative. Genitourinary:  Positive for frequency, hematuria and urgency. Negative for difficulty urinating, dysuria and flank pain. Musculoskeletal: Negative. Negative for back pain and neck stiffness. Skin: Negative. Negative for rash. Allergic/Immunologic: Negative. Neurological: Negative. Negative for dizziness, syncope, weakness, light-headedness, numbness and headaches. Hematological: Negative. Psychiatric/Behavioral: Negative. Negative for confusion and self-injury. Physical Exam   Physical Exam  Vitals and nursing note reviewed. Constitutional:       General: She is not in acute distress. Appearance: She is well-developed. She is not diaphoretic. HENT:      Head: Normocephalic and atraumatic. Mouth/Throat:      Pharynx: No oropharyngeal exudate. Eyes:      Conjunctiva/sclera: Conjunctivae normal.      Pupils: Pupils are equal, round, and reactive to light.    Cardiovascular: Rate and Rhythm: Normal rate and regular rhythm. Heart sounds: Normal heart sounds. No murmur heard. No friction rub. No gallop. Pulmonary:      Effort: Pulmonary effort is normal. No respiratory distress. Breath sounds: Normal breath sounds. No wheezing or rales. Chest:      Chest wall: No tenderness. Abdominal:      General: Bowel sounds are normal. There is no distension. Palpations: Abdomen is soft. There is no mass. Tenderness: There is no abdominal tenderness. There is no guarding or rebound. Musculoskeletal:         General: No tenderness or deformity. Normal range of motion. Cervical back: Normal range of motion. Skin:     General: Skin is warm. Findings: No rash. Neurological:      Mental Status: She is alert and oriented to person, place, and time. Cranial Nerves: No cranial nerve deficit. Motor: No abnormal muscle tone. Coordination: Coordination normal.      Deep Tendon Reflexes: Reflexes normal.     Diagnostic Study Results     Laboratory Data  I have personally reviewed and interpreted all available laboratory results. Recent Results (from the past 24 hour(s))   URINALYSIS W/ REFLEX CULTURE    Collection Time: 11/19/22  9:01 PM    Specimen: Urine   Result Value Ref Range    Color YELLOW/STRAW      Appearance CLOUDY (A) CLEAR      Specific gravity 1.025      pH (UA) 5.5 5.0 - 8.0      Protein 100 (A) NEG mg/dL    Glucose Negative NEG mg/dL    Ketone TRACE (A) NEG mg/dL    Bilirubin Negative NEG      Blood LARGE (A) NEG      Urobilinogen 1.0 0.2 - 1.0 EU/dL    Nitrites Negative NEG      Leukocyte Esterase MODERATE (A) NEG      WBC 10-20 0 - 4 /hpf    RBC 20-50 0 - 5 /hpf    Epithelial cells FEW FEW /lpf    Bacteria Negative NEG /hpf    UA:UC IF INDICATED URINE CULTURE ORDERED (A) CNI         Radiologic Studies   I have personally reviewed and interpreted all available imaging studies and agree with radiology interpretation.   No orders to display     CT Results  (Last 48 hours)      None          CXR Results  (Last 48 hours)      None          Medical Decision Making   I am the first and primary ED physician for this patient's ED visit today. I reviewed our EMR for any past records that may contribute to the patient's current condition, including their past medical, surgical, social and family history. This also includes their most recent ED visits, previous hospitalizations and prior diagnostic data. I have reviewed and summarized the most pertinent findings in my HPI and MDM. Vital Signs Reviewed:  Patient Vitals for the past 24 hrs:   Temp Pulse Resp BP SpO2   11/19/22 2047 98.3 °F (36.8 °C) 92 16 (!) 117/59 96 %     Pulse Oximetry Analysis: 96% on RA    Cardiac Monitor:   Rate: 92 bpm  The cardiac monitor revealed the following rhythm as interpreted by me: Normal Sinus Rhythm  Cardiac monitoring was ordered to monitor patient for signs of cardiac dysrhythmia, which they are at risk for based on their history and/or risk for cardiovascular disease and/or metabolic abnormalities. Records Reviewed: Nursing Notes, Old Medical Records, Previous electrocardiograms, Previous Radiology Studies and Previous Laboratory Studies, EMS reports    Provider Notes (Medical Decision Making):   Patient presents with hematuria, urgency and urinary frequency. Stable vitals and benign abdominal exam. DDx: Acute cystitis, pyelonephritis, ureteral stone, STI. Will obtain UA and treat accordingly. If patient expresses concern for STI exposure, will test and empirically treat. Also, I provided education on the importance of testing and treating partners and using safe sex practices in the future. Discussed with the patient diagnosis and test results and all questioned fully answered. They understand the importance of staying well hydrated, taking antibiotics as prescribed to completion. She will follow-up with PCP if any problems arise.      ED Course: Initial assessment performed. I discussed presenting problems and concerns, and my formulated plan for today's visit with the patient and any available family members. I have encouraged them to ask questions as they arise throughout the visit. Social History     Tobacco Use    Smoking status: Former    Smokeless tobacco: Never   Substance Use Topics    Alcohol use: Yes     Comment: wine    Drug use: Yes     Types: Marijuana       ED Orders Placed:  Orders Placed This Encounter    CULTURE, URINE    URINALYSIS W/ REFLEX CULTURE    cephALEXin (KEFLEX) capsule 500 mg    cephALEXin (Keflex) 500 mg capsule       ED Medications Administered During ED Course:  Medications   cephALEXin (KEFLEX) capsule 500 mg (500 mg Oral Given 11/19/22 7931)        Amount and/or Complexity of Data Reviewed  Clinical lab tests: ordered as appropriate & reviewed  Tests in the radiology section of CPT®: ordered as appropriate & reviewed  Tests in the medicine section of CPT®: ordered as appropriate & reviewed  Obtain history from someone other than the patient: yes  Review and summarize past medical records: yes  Independent visualization of images, tracings, or specimens: yes     Progress Note:  I have just re-evaluated the patient. Patient reports improvement of symptoms. I have reviewed her vital signs and determined there is currently no worsening in their condition or physical exam. Results have been reviewed with them and their questions have been answered. I will continue to review further results as they come available. Progress Note:  I have re-examined the patient. Pt states she feels much better and symptoms improved. Tolerating oral intake. Abdomen is soft and without guarding, rebound or other peritoneal signs. I have discussed with patient the importance of close f/u and to return to the ED if symptoms don't improve or worsen.     Patient Reassessment:  Pt reassessed and symptoms noted to have improved significantly after ED treatment. Scott Ortega labs and imaging have been reviewed with her and available family. She verbally conveys understanding and agreement of the signs, symptoms, diagnosis, treatment and prognosis and additionally agrees to follow up as recommended with Dr. Ernie Jimenez, NP and/or specialist as instructed. She agrees with the care plan we have created and conveys that all of her questions have been answered. Additionally, I have put together a packet of discharge instructions for her that include: 1) Educational information regarding their diagnosis, 2) How to care for their diagnosis at home, as well a 3) List of reasons why they would want to return to the ED prior to their follow-up appointment should their condition change or symptoms worsen. Disposition:  DISCHARGE  The pt is ready for discharge. The pt's signs, symptoms, diagnosis, and discharge instructions have been discussed and pt has conveyed their understanding. The pt is to follow up as recommended or return to ER should their symptoms worsen. Plan has been discussed and pt is in agreement. I have answered all questions to the patient's satisfaction. Strict return precautions given. She and/or family conveyed understanding and agreement with care plan. Vital signs stable for discharge. Plan:  1. Return precautions as discussed with patient and available family/caregiver. 2.   Discharge Medication List as of 11/19/2022  9:41 PM      No current facility-administered medications for this encounter. Current Outpatient Medications:     cephALEXin (Keflex) 500 mg capsule, Take 1 Capsule by mouth four (4) times daily for 7 days. , Disp: 28 Capsule, Rfl: 0    mirtazapine (REMERON) 15 mg tablet, Take  by mouth nightly., Disp: , Rfl:     memantine (NAMENDA) 10 mg tablet, Take 10 mg by mouth daily. , Disp: , Rfl:     donepeziL (ARICEPT) 10 mg tablet, Take 10 mg by mouth nightly., Disp: , Rfl:     omeprazole (PRILOSEC) 40 mg capsule, Take 40 mg by mouth daily. , Disp: , Rfl:     loperamide (IMODIUM) 2 mg capsule, Take 1 Cap by mouth two (2) times daily as needed for Diarrhea., Disp: 20 Cap, Rfl: 0    ondansetron hcl (Zofran) 4 mg tablet, Take 1 Tab by mouth every eight (8) hours as needed for Nausea or Vomiting., Disp: 10 Tab, Rfl: 0    metFORMIN ER (GLUCOPHAGE XR) 500 mg tablet, take 1 tablet by mouth once daily with DINNER, Disp: 30 Tab, Rfl: 3    esomeprazole (NEXIUM) 40 mg capsule, Take 1 Cap by mouth daily. , Disp: 30 Cap, Rfl: 5    amitriptyline (ELAVIL) 150 mg tablet, Take 150 mg by mouth nightly., Disp: , Rfl:     gabapentin (NEURONTIN) 300 mg capsule, Take 2 Caps by mouth three (3) times daily. , Disp: 180 Cap, Rfl: 11    naproxen (NAPROSYN) 500 mg tablet, Take 1 Tab by mouth two (2) times daily (with meals). , Disp: 60 Tab, Rfl: 11    LORazepam (ATIVAN) 1 mg tablet, Take one tablet by mouth prior to EMG procedure., Disp: 1 Tab, Rfl: 0    food supplemt, lactose-reduced (ENSURE COMPLETE) 0.05-1.5 gram-kcal/mL liqd, Take 1 Can by mouth three (3) times daily (with meals). , Disp: 90 Can, Rfl: 1    risperiDONE (RISPERDAL) 3 mg tablet, Take 6 mg by mouth nightly., Disp: , Rfl:     sertraline (ZOLOFT) 100 mg tablet, Take 200 mg by mouth daily. , Disp: , Rfl:     3. Follow-up Information       Follow up With Specialties Details Why Contact Eriberto Solis NP Nurse Practitioner  As needed, If symptoms worsen [address]  [phone]            Instructed to return to ED if worse  Diagnosis   Clinical Impression:  1. Urinary tract infection without hematuria, site unspecified    2. Lower urinary tract symptoms (LUTS)    3. Hematuria, unspecified type      Attestation:  Susana Angeles MD, am the attending of record for this patient. I personally performed the services described in this documentation on this date, 11/19/2022 for patient, Philip Maguire.  I have reviewed the chart and verified that the record is accurate and complete.

## 2022-11-20 NOTE — DISCHARGE INSTRUCTIONS
Thank You! It was a pleasure taking care of you in our Emergency Department today. We know that when you come to [address] [place] [address], you are entrusting us with your health, comfort, and safety. Our physicians and nurses honor that trust, and truly appreciate the opportunity to care for you and your loved ones. We also value your feedback. If you receive a survey about your Emergency Department experience today, please fill it out. We care about our patients' feedback, and we listen to what you have to say. Thank you. Dr. Gilbert Urrutia M.D.      ____________________________________________________________________  I have included a copy of your lab results and/or radiologic studies from today's visit so you can have them easily available at your follow-up visit. We hope you feel better and please do not hesitate to contact the ED if you have any questions at all! Recent Results (from the past 12 hour(s))   URINALYSIS W/ REFLEX CULTURE    Collection Time: 11/19/22  9:01 PM    Specimen: Urine   Result Value Ref Range    Color YELLOW/STRAW      Appearance CLOUDY (A) CLEAR      Specific gravity 1.025      pH (UA) 5.5 5.0 - 8.0      Protein 100 (A) NEG mg/dL    Glucose Negative NEG mg/dL    Ketone TRACE (A) NEG mg/dL    Bilirubin Negative NEG      Blood LARGE (A) NEG      Urobilinogen 1.0 0.2 - 1.0 EU/dL    Nitrites Negative NEG      Leukocyte Esterase MODERATE (A) NEG      WBC 10-20 0 - 4 /hpf    RBC 20-50 0 - 5 /hpf    Epithelial cells FEW FEW /lpf    Bacteria Negative NEG /hpf    UA:UC IF INDICATED URINE CULTURE ORDERED (A) CNI         No orders to display     CT Results  (Last 48 hours)      None          The exam and treatment you received in the Emergency Department were for an urgent problem and are not intended as complete care. It is important that you follow up with a doctor, nurse practitioner, or physician assistant for ongoing care.  If your symptoms become worse or you do not improve as expected and you are unable to reach your usual health care provider, you should return to the Emergency Department. We are available 24 hours a day. Please take your discharge instructions with you when you go to your follow-up appointment. If a prescription has been provided, please have it filled as soon as possible to prevent a delay in treatment. Read the entire medication instruction sheet provided to you by the pharmacy. If you have any questions or reservations about taking the medication due to side effects or interactions with other medications, please call your primary care physician or contact the ER to speak with the charge nurse. Please make an appointment with your family doctor or the physician you were referred to for follow-up of this visit as instructed on your discharge paperwork. Return to the ER if you are unable to be seen or if you are unable to be seen in a timely manner. If you have any problem arranging the follow-up visit, contact the Emergency Department immediately.

## 2022-11-22 LAB
BACTERIA SPEC CULT: ABNORMAL
CC UR VC: ABNORMAL
SERVICE CMNT-IMP: ABNORMAL